# Patient Record
Sex: MALE | Race: WHITE | NOT HISPANIC OR LATINO | Employment: FULL TIME | URBAN - METROPOLITAN AREA
[De-identification: names, ages, dates, MRNs, and addresses within clinical notes are randomized per-mention and may not be internally consistent; named-entity substitution may affect disease eponyms.]

---

## 2017-08-24 PROBLEM — E11.42 TYPE 2 DIABETES MELLITUS WITH DIABETIC POLYNEUROPATHY, WITHOUT LONG-TERM CURRENT USE OF INSULIN (HCC): Status: ACTIVE | Noted: 2017-08-24

## 2018-02-14 ENCOUNTER — PATIENT OUTREACH (OUTPATIENT)
Dept: HEALTH INFORMATION MANAGEMENT | Facility: OTHER | Age: 53
End: 2018-02-14

## 2022-06-06 ENCOUNTER — OFFICE VISIT (OUTPATIENT)
Dept: URGENT CARE | Facility: CLINIC | Age: 57
End: 2022-06-06
Payer: COMMERCIAL

## 2022-06-06 VITALS
OXYGEN SATURATION: 95 % | HEART RATE: 100 BPM | SYSTOLIC BLOOD PRESSURE: 122 MMHG | BODY MASS INDEX: 31.44 KG/M2 | RESPIRATION RATE: 16 BRPM | DIASTOLIC BLOOD PRESSURE: 78 MMHG | HEIGHT: 74 IN | TEMPERATURE: 97.7 F | WEIGHT: 245 LBS

## 2022-06-06 DIAGNOSIS — R19.7 DIARRHEA OF PRESUMED INFECTIOUS ORIGIN: ICD-10-CM

## 2022-06-06 PROCEDURE — 99203 OFFICE O/P NEW LOW 30 MIN: CPT | Performed by: NURSE PRACTITIONER

## 2022-06-06 RX ORDER — ROSUVASTATIN CALCIUM 5 MG/1
5 TABLET, COATED ORAL EVERY EVENING
COMMUNITY
End: 2023-06-21

## 2022-06-06 RX ORDER — EMPAGLIFLOZIN AND METFORMIN HYDROCHLORIDE 12.5; 1 MG/1; MG/1
TABLET ORAL
COMMUNITY
End: 2023-06-21

## 2022-06-06 RX ORDER — LOPERAMIDE HYDROCHLORIDE 2 MG/1
2 CAPSULE ORAL
Qty: 30 CAPSULE | Refills: 0 | Status: SHIPPED | OUTPATIENT
Start: 2022-06-06 | End: 2023-06-21

## 2022-06-06 RX ORDER — AZITHROMYCIN 500 MG/1
500 TABLET, FILM COATED ORAL DAILY
Qty: 3 TABLET | Refills: 0 | Status: SHIPPED | OUTPATIENT
Start: 2022-06-06 | End: 2022-06-09

## 2022-06-06 RX ORDER — INSULIN DEGLUDEC 200 U/ML
INJECTION, SOLUTION SUBCUTANEOUS
COMMUNITY
End: 2023-06-21

## 2022-06-06 ASSESSMENT — ENCOUNTER SYMPTOMS
CARDIOVASCULAR NEGATIVE: 1
NAUSEA: 1
BLOOD IN STOOL: 0
DIARRHEA: 1
RESPIRATORY NEGATIVE: 1
ABDOMINAL PAIN: 1
VOMITING: 0

## 2022-06-06 ASSESSMENT — VISUAL ACUITY: OU: 1

## 2022-06-06 NOTE — PROGRESS NOTES
Subjective:     Reinier Soria is a 56 y.o. male who presents for Diarrhea (X 5 days, diarrhea and nausea)       Diarrhea   This is a new problem. The problem has been gradually worsening. Associated symptoms include abdominal pain. Pertinent negatives include no vomiting. Risk factors: .     Patient reporting about 12 episodes of diarrhea each day. Feels bloated. Fatigued. Nauseous. No vomiting. 5 days of symptoms. Worsening.    Patient was screened prior to rooming and denied COVID-19 diagnosis or contact with a person who has been diagnosed or is suspected to have COVID-19. During this visit, appropriate PPE was worn, hand hygiene was performed, and the patient and any visitors were masked.     PMH:  has a past medical history of Chicken pox, Diabetes mellitus type 2, uncontrolled (HCC) (12/16/2011), Hypertension (4/21/2010), and Increased BMI (4/21/2010).    MEDS:   Current Outpatient Medications:   •  Insulin Degludec (TRESIBA FLEXTOUCH) 200 UNIT/ML Solution Pen-injector, Inject  under the skin., Disp: , Rfl:   •  Semaglutide (OZEMPIC, 2 MG/DOSE, SC), Inject  under the skin., Disp: , Rfl:   •  Empagliflozin-metFORMIN HCl (SYNJARDY) 12.5-1000 MG Tab, Take  by mouth., Disp: , Rfl:   •  rosuvastatin (CRESTOR) 5 MG Tab, Take 5 mg by mouth every evening., Disp: , Rfl:   •  azithromycin (ZITHROMAX) 500 MG tablet, Take 1 Tablet by mouth every day for 3 days., Disp: 3 Tablet, Rfl: 0  •  loperamide (IMODIUM) 2 MG Cap, Take 1 Capsule by mouth every 3 hours as needed for Diarrhea., Disp: 30 Capsule, Rfl: 0  •  pioglitazone (ACTOS) 15 MG Tab, TAKE ONE TABLET BY MOUTH DAILY, Disp: 90 Tab, Rfl: 1  •  lisinopril (PRINIVIL) 5 MG Tab, TAKE ONE TABLET BY MOUTH DAILY, Disp: 90 Tab, Rfl: 1  •  gabapentin (NEURONTIN) 600 MG tablet, TAKE ONE TABLET BY MOUTH THREE TIMES DAILY, Disp: 270 Tab, Rfl: 1  •  gemfibrozil (LOPID) 600 MG Tab, TAKE ONE TABLET BY MOUTH TWICE DAILY, Disp: 180 Tab, Rfl: 1  •  ibuprofen (MOTRIN)  "600 MG TABS, Take 1 Tab by mouth every 6 hours as needed for Moderate Pain., Disp: 30 Tab, Rfl: 0  •  aspirin (ASA) 81 MG CHEW chewable tablet, Take 81 mg by mouth every day., Disp: , Rfl:   •  Blood Glucose Monitoring Suppl (GLUCOMETER ELITE CLASSIC) KIT, 1 Device by Does not apply route every day at 6 PM., Disp: 1 Each, Rfl: prn  •  metformin (GLUCOPHAGE) 1000 MG tablet, TAKE ONE TABLET BY MOUTH TWICE A DAY (Patient not taking: Reported on 6/6/2022), Disp: 180 Tab, Rfl: 1  •  glipiZIDE (GLUCOTROL) 5 MG Tab, TAKE ONE TABLET BY MOUTH TWICE A DAY (Patient not taking: Reported on 6/6/2022), Disp: 180 Tab, Rfl: 1    ALLERGIES:   Allergies   Allergen Reactions   • Hctz      Leg cramps     SURGHX:   Past Surgical History:   Procedure Laterality Date   • VENTRAL HERNIA REPAIR LAPAROSCOPIC  4/10/2015    Performed by Sadiq Ortega M.D. at SURGERY [facility] ORS   • AMPUTATION, TOE  2006    left large toe   • INGUINAL HERNIA REPAIR BILATERAL  1992    repaired twice   • TONSILLECTOMY       SOCHX:  reports that he quit smoking about 7 years ago. His smoking use included cigarettes. He has a 30.00 pack-year smoking history. He quit smokeless tobacco use about 40 years ago. He reports current alcohol use. He reports that he does not use drugs.     FH: Reviewed with patient, not pertinent to this visit.    Review of Systems   Constitutional: Positive for malaise/fatigue.   HENT: Negative.    Respiratory: Negative.    Cardiovascular: Negative.    Gastrointestinal: Positive for abdominal pain, diarrhea and nausea. Negative for blood in stool and vomiting.   All other systems reviewed and are negative.    Additional details per HPI.      Objective:     /78 (BP Location: Left arm, Patient Position: Sitting, BP Cuff Size: Large adult)   Pulse 100   Temp 36.5 °C (97.7 °F) (Temporal)   Resp 16   Ht 1.88 m (6' 2\")   Wt 111 kg (245 lb)   SpO2 95%   BMI 31.46 kg/m²     Physical Exam  Vitals reviewed.   Constitutional:       " General: He is not in acute distress.     Appearance: He is well-developed. He is not ill-appearing or toxic-appearing.   Eyes:      General: Vision grossly intact.      Extraocular Movements: Extraocular movements intact.   Cardiovascular:      Rate and Rhythm: Normal rate.   Pulmonary:      Effort: Pulmonary effort is normal. No respiratory distress.   Abdominal:      General: There is distension.      Palpations: Abdomen is soft.      Tenderness: There is no abdominal tenderness.   Musculoskeletal:         General: No deformity. Normal range of motion.      Cervical back: Normal range of motion.   Skin:     General: Skin is warm and dry.      Coloration: Skin is not pale.   Neurological:      Mental Status: He is alert and oriented to person, place, and time.      Sensory: No sensory deficit.      Motor: No weakness.   Psychiatric:         Behavior: Behavior normal. Behavior is cooperative.       Assessment/Plan:     1. Diarrhea of presumed infectious origin  - azithromycin (ZITHROMAX) 500 MG tablet; Take 1 Tablet by mouth every day for 3 days.  Dispense: 3 Tablet; Refill: 0  - loperamide (IMODIUM) 2 MG Cap; Take 1 Capsule by mouth every 3 hours as needed for Diarrhea.  Dispense: 30 Capsule; Refill: 0    Rx as above sent electronically.     Discussed clear liquid, BRAT, and bland diets and then advancing as tolerated.     Differential diagnosis, natural history, supportive care, increased fluids, over-the-counter symptom management per 's instructions, close monitoring, and indications for immediate follow-up discussed.     Warning signs reviewed. Return precautions discussed.     All questions answered. Patient agrees with the plan of care.    Discharge summary provided.

## 2022-08-20 ENCOUNTER — APPOINTMENT (OUTPATIENT)
Dept: RADIOLOGY | Facility: MEDICAL CENTER | Age: 57
End: 2022-08-20
Attending: EMERGENCY MEDICINE
Payer: COMMERCIAL

## 2022-08-20 ENCOUNTER — HOSPITAL ENCOUNTER (EMERGENCY)
Facility: MEDICAL CENTER | Age: 57
End: 2022-08-20
Attending: EMERGENCY MEDICINE
Payer: COMMERCIAL

## 2022-08-20 VITALS
SYSTOLIC BLOOD PRESSURE: 101 MMHG | RESPIRATION RATE: 18 BRPM | OXYGEN SATURATION: 93 % | HEIGHT: 74 IN | HEART RATE: 78 BPM | TEMPERATURE: 97.8 F | DIASTOLIC BLOOD PRESSURE: 71 MMHG | WEIGHT: 253.31 LBS | BODY MASS INDEX: 32.51 KG/M2

## 2022-08-20 DIAGNOSIS — E11.621 DIABETIC ULCER OF TOE OF RIGHT FOOT ASSOCIATED WITH TYPE 2 DIABETES MELLITUS, LIMITED TO BREAKDOWN OF SKIN (HCC): ICD-10-CM

## 2022-08-20 DIAGNOSIS — L97.511 DIABETIC ULCER OF TOE OF RIGHT FOOT ASSOCIATED WITH TYPE 2 DIABETES MELLITUS, LIMITED TO BREAKDOWN OF SKIN (HCC): ICD-10-CM

## 2022-08-20 LAB
ALBUMIN SERPL BCP-MCNC: 4.4 G/DL (ref 3.2–4.9)
ALBUMIN/GLOB SERPL: 1.9 G/DL
ALP SERPL-CCNC: 81 U/L (ref 30–99)
ALT SERPL-CCNC: 21 U/L (ref 2–50)
ANION GAP SERPL CALC-SCNC: 14 MMOL/L (ref 7–16)
AST SERPL-CCNC: 21 U/L (ref 12–45)
BASOPHILS # BLD AUTO: 1.3 % (ref 0–1.8)
BASOPHILS # BLD: 0.07 K/UL (ref 0–0.12)
BILIRUB SERPL-MCNC: 0.4 MG/DL (ref 0.1–1.5)
BUN SERPL-MCNC: 17 MG/DL (ref 8–22)
CALCIUM SERPL-MCNC: 9.6 MG/DL (ref 8.5–10.5)
CHLORIDE SERPL-SCNC: 106 MMOL/L (ref 96–112)
CO2 SERPL-SCNC: 20 MMOL/L (ref 20–33)
CREAT SERPL-MCNC: 0.7 MG/DL (ref 0.5–1.4)
EOSINOPHIL # BLD AUTO: 0.26 K/UL (ref 0–0.51)
EOSINOPHIL NFR BLD: 4.9 % (ref 0–6.9)
ERYTHROCYTE [DISTWIDTH] IN BLOOD BY AUTOMATED COUNT: 40.2 FL (ref 35.9–50)
ERYTHROCYTE [SEDIMENTATION RATE] IN BLOOD BY WESTERGREN METHOD: 3 MM/HOUR (ref 0–20)
GFR SERPLBLD CREATININE-BSD FMLA CKD-EPI: 108 ML/MIN/1.73 M 2
GLOBULIN SER CALC-MCNC: 2.3 G/DL (ref 1.9–3.5)
GLUCOSE SERPL-MCNC: 229 MG/DL (ref 65–99)
HCT VFR BLD AUTO: 45.9 % (ref 42–52)
HGB BLD-MCNC: 15.8 G/DL (ref 14–18)
IMM GRANULOCYTES # BLD AUTO: 0.02 K/UL (ref 0–0.11)
IMM GRANULOCYTES NFR BLD AUTO: 0.4 % (ref 0–0.9)
LYMPHOCYTES # BLD AUTO: 1.49 K/UL (ref 1–4.8)
LYMPHOCYTES NFR BLD: 27.9 % (ref 22–41)
MCH RBC QN AUTO: 30.6 PG (ref 27–33)
MCHC RBC AUTO-ENTMCNC: 34.4 G/DL (ref 33.7–35.3)
MCV RBC AUTO: 89 FL (ref 81.4–97.8)
MONOCYTES # BLD AUTO: 0.39 K/UL (ref 0–0.85)
MONOCYTES NFR BLD AUTO: 7.3 % (ref 0–13.4)
NEUTROPHILS # BLD AUTO: 3.11 K/UL (ref 1.82–7.42)
NEUTROPHILS NFR BLD: 58.2 % (ref 44–72)
NRBC # BLD AUTO: 0 K/UL
NRBC BLD-RTO: 0 /100 WBC
PLATELET # BLD AUTO: 190 K/UL (ref 164–446)
PMV BLD AUTO: 10.6 FL (ref 9–12.9)
POTASSIUM SERPL-SCNC: 4.1 MMOL/L (ref 3.6–5.5)
PROT SERPL-MCNC: 6.7 G/DL (ref 6–8.2)
RBC # BLD AUTO: 5.16 M/UL (ref 4.7–6.1)
SODIUM SERPL-SCNC: 140 MMOL/L (ref 135–145)
WBC # BLD AUTO: 5.3 K/UL (ref 4.8–10.8)

## 2022-08-20 PROCEDURE — 73620 X-RAY EXAM OF FOOT: CPT | Mod: LT

## 2022-08-20 PROCEDURE — 85652 RBC SED RATE AUTOMATED: CPT

## 2022-08-20 PROCEDURE — A9270 NON-COVERED ITEM OR SERVICE: HCPCS | Performed by: EMERGENCY MEDICINE

## 2022-08-20 PROCEDURE — 99284 EMERGENCY DEPT VISIT MOD MDM: CPT

## 2022-08-20 PROCEDURE — 700102 HCHG RX REV CODE 250 W/ 637 OVERRIDE(OP): Performed by: EMERGENCY MEDICINE

## 2022-08-20 PROCEDURE — 36415 COLL VENOUS BLD VENIPUNCTURE: CPT

## 2022-08-20 PROCEDURE — 80053 COMPREHEN METABOLIC PANEL: CPT

## 2022-08-20 PROCEDURE — 85025 COMPLETE CBC W/AUTO DIFF WBC: CPT

## 2022-08-20 RX ORDER — AMOXICILLIN AND CLAVULANATE POTASSIUM 875; 125 MG/1; MG/1
1 TABLET, FILM COATED ORAL 2 TIMES DAILY
Qty: 20 TABLET | Refills: 0 | Status: SHIPPED | OUTPATIENT
Start: 2022-08-20 | End: 2022-09-30

## 2022-08-20 RX ORDER — IBUPROFEN 600 MG/1
600 TABLET ORAL ONCE
Status: COMPLETED | OUTPATIENT
Start: 2022-08-20 | End: 2022-08-20

## 2022-08-20 RX ORDER — ACETAMINOPHEN 325 MG/1
650 TABLET ORAL ONCE
Status: COMPLETED | OUTPATIENT
Start: 2022-08-20 | End: 2022-08-20

## 2022-08-20 RX ADMIN — IBUPROFEN 600 MG: 600 TABLET ORAL at 16:23

## 2022-08-20 RX ADMIN — ACETAMINOPHEN 650 MG: 325 TABLET, FILM COATED ORAL at 16:22

## 2022-08-20 NOTE — ED PROVIDER NOTES
ED Provider  Scribed for Tyler Romero D.O. by Brandy Evans. 2022  1:55 PM    Means of arrival: Walk-in  History obtained from: Patient  History limited by: None    CHIEF COMPLAINT  Chief Complaint   Patient presents with    Wound Check     Hx of diabetes and diabetic neuropathy. Has wounds to bilateral feet, individual toes. Some with black coloration. Concerned for infection.      HPI  Reinier Soria is a 56 y.o. male, with a history of type 2 diabetes, who presents to the Emergency Department for a wound check. The patient reports an amputation of his left big toe. 3 months ago he noticed symptoms of swelling of his left toes, a blister on his amputated left big toe, and discoloration of his second left toe. The patient also notes that a few of his toe nails fell off 2 weeks ago. He denies any known trauma, fever, any other sores, or pain secondary to his neuropathy. The patient denies seeing a vascular doctor. No alleviating or exacerbating factors noted.     REVIEW OF SYSTEMS  See HPI for further details. All other systems negative.     PAST MEDICAL HISTORY   has a past medical history of Chicken pox, Diabetes mellitus type 2, uncontrolled (HCC) (2011), Hypertension (2010), and Increased BMI (2010).    SOCIAL HISTORY  Social History     Tobacco Use    Smoking status: Former     Packs/day: 1.00     Years: 30.00     Pack years: 30.00     Types: Cigarettes     Quit date: 2014     Years since quittin.0    Smokeless tobacco: Former     Quit date: 1982   Substance and Sexual Activity    Alcohol use: Yes     Comment: rare    Drug use: No    Sexual activity: Yes     Partners: Male, Female     SURGICAL HISTORY   has a past surgical history that includes inguinal hernia repair bilateral (); amputation, toe (); tonsillectomy; and ventral hernia repair laparoscopic (4/10/2015).    CURRENT MEDICATIONS  Home Medications       Reviewed by Marshall Rubio R.N. (Registered  "Nurse) on 08/20/22 at 0953  Med List Status: Partial     Medication Last Dose Status   aspirin (ASA) 81 MG CHEW chewable tablet  Active   Blood Glucose Monitoring Suppl (GLUCOMETER ELITE CLASSIC) KIT  Active   Empagliflozin-metFORMIN HCl (SYNJARDY) 12.5-1000 MG Tab  Active   gabapentin (NEURONTIN) 600 MG tablet  Active   gemfibrozil (LOPID) 600 MG Tab  Active   glipiZIDE (GLUCOTROL) 5 MG Tab  Active   ibuprofen (MOTRIN) 600 MG TABS  Active   Insulin Degludec (TRESIBA FLEXTOUCH) 200 UNIT/ML Solution Pen-injector  Active   lisinopril (PRINIVIL) 5 MG Tab  Active   loperamide (IMODIUM) 2 MG Cap  Active   metformin (GLUCOPHAGE) 1000 MG tablet  Active   pioglitazone (ACTOS) 15 MG Tab  Active   rosuvastatin (CRESTOR) 5 MG Tab  Active   Semaglutide (OZEMPIC, 2 MG/DOSE, SC)  Active                  ALLERGIES  Allergies   Allergen Reactions    Hctz      Leg cramps     PHYSICAL EXAM  VITAL SIGNS: /81   Pulse 97   Temp 36.7 °C (98 °F) (Temporal)   Resp 16   Ht 1.88 m (6' 2\")   Wt 115 kg (253 lb 4.9 oz)   SpO2 95%   BMI 32.52 kg/m²   Constitutional: Alert in no apparent distress.  HENT: Normocephalic, Atraumatic, mucous membranes are moist.   Eyes: Conjunctiva normal, non-icteric.   Heart: No peripheral cyanosis   Lungs: Respirations are even and unlabored   Skin: Warm, Dry, normal color.   Extremities: Left right big toe has a 1.5 cm ulceration at the plantar surface, there is discoloration of the second toe with an obvious darkened color. The bal of the right foot has a 75 mm ulceration. DP pulses normal.  Neurologic: Alert, Grossly non-focal.   Psychiatric: Affect normal, Judgment normal, Mood normal, Appears appropriate and not intoxicated.     DIAGNOSTIC STUDIES / PROCEDURES  LABS  Results for orders placed or performed during the hospital encounter of 08/20/22   CBC WITH DIFFERENTIAL   Result Value Ref Range    WBC 5.3 4.8 - 10.8 K/uL    RBC 5.16 4.70 - 6.10 M/uL    Hemoglobin 15.8 14.0 - 18.0 g/dL    " Hematocrit 45.9 42.0 - 52.0 %    MCV 89.0 81.4 - 97.8 fL    MCH 30.6 27.0 - 33.0 pg    MCHC 34.4 33.7 - 35.3 g/dL    RDW 40.2 35.9 - 50.0 fL    Platelet Count 190 164 - 446 K/uL    MPV 10.6 9.0 - 12.9 fL    Neutrophils-Polys 58.20 44.00 - 72.00 %    Lymphocytes 27.90 22.00 - 41.00 %    Monocytes 7.30 0.00 - 13.40 %    Eosinophils 4.90 0.00 - 6.90 %    Basophils 1.30 0.00 - 1.80 %    Immature Granulocytes 0.40 0.00 - 0.90 %    Nucleated RBC 0.00 /100 WBC    Neutrophils (Absolute) 3.11 1.82 - 7.42 K/uL    Lymphs (Absolute) 1.49 1.00 - 4.80 K/uL    Monos (Absolute) 0.39 0.00 - 0.85 K/uL    Eos (Absolute) 0.26 0.00 - 0.51 K/uL    Baso (Absolute) 0.07 0.00 - 0.12 K/uL    Immature Granulocytes (abs) 0.02 0.00 - 0.11 K/uL    NRBC (Absolute) 0.00 K/uL   COMP METABOLIC PANEL   Result Value Ref Range    Sodium 140 135 - 145 mmol/L    Potassium 4.1 3.6 - 5.5 mmol/L    Chloride 106 96 - 112 mmol/L    Co2 20 20 - 33 mmol/L    Anion Gap 14.0 7.0 - 16.0    Glucose 229 (H) 65 - 99 mg/dL    Bun 17 8 - 22 mg/dL    Creatinine 0.70 0.50 - 1.40 mg/dL    Calcium 9.6 8.5 - 10.5 mg/dL    AST(SGOT) 21 12 - 45 U/L    ALT(SGPT) 21 2 - 50 U/L    Alkaline Phosphatase 81 30 - 99 U/L    Total Bilirubin 0.4 0.1 - 1.5 mg/dL    Albumin 4.4 3.2 - 4.9 g/dL    Total Protein 6.7 6.0 - 8.2 g/dL    Globulin 2.3 1.9 - 3.5 g/dL    A-G Ratio 1.9 g/dL   Sed Rate   Result Value Ref Range    Sed Rate Westergren 3 0 - 20 mm/hour   ESTIMATED GFR   Result Value Ref Range    GFR (CKD-EPI) 108 >60 mL/min/1.73 m 2     All labs reviewed by me.    RADIOLOGY  DX-FOOT-2- LEFT   Final Result      1.  No evidence of acute fracture or dislocation.   2.  No osseous destruction is seen to suggest osteomyelitis. MRI would be more sensitive for further evaluation.   3.  Amputation of the distal aspect of the distal phalanx of the first digit.   4.  Hallux valgus deformity.   5.  Soft tissue wound involving the plantar forefoot.   6.  Calcaneal spurring.        The radiologist's  interpretations of all radiological studies have been reviewed by me.    Films have been independently by me      COURSE  Pertinent Labs & Imaging studies reviewed. (See chart for details)    1:55 PM - Patient seen and examined at bedside. Discussed plan of care. The patient was given the opportunity to ask questions at this time. Patient agrees to the plan of care.     2:45 PM - Ordered for Sed Rate, CMP, CBC w/ diff., DX-Foot 2-Left, and Estimated GFR to evaluate his symptoms.     5:44 PM - I reevaluated the patient at bedside. I discussed plan for discharge and follow up as outlined below. The patient is stable for discharge at this time and will return for any new or worsening symptoms. Patient verbalizes understanding and support with my plan for discharge.     MEDICAL DECISION MAKING  This is a 56 y.o. male who presents with wounds to the base of the foot.  He has a history of a partial amputation of the great toe he has small ulcerations to the foot and the base of the toe.  There is also discoloration to the second toe the discoloration is primarily at the toenail.  There is no skin blackness I do not suspect gangrene.  There is no signs of significant infection.  He is referred to wound care clinic.  No signs of osteomyelitis at this time.    The patient will return for new or worsening symptoms and is stable at the time of discharge.    The patient is referred to a primary physician for diabetic screening, and for all other preventative health concerns.    DISPOSITION:  Patient will be discharged home in stable condition.    FOLLOW UP:  [facility] [address] [phone]  In 1 week    OUTPATIENT MEDICATIONS:  Discharge Medication List as of 8/20/2022  5:58 PM        START taking these medications    Details   amoxicillin-clavulanate (AUGMENTIN) 875-125 MG Tab Take 1 Tablet by mouth 2 times a day., Disp-20 Tablet, R-0, Normal            FINAL IMPRESSION  1. Diabetic ulcer of toe of right foot associated with type 2 diabetes mellitus, limited to breakdown of skin (HCC)      IBrandy (Scribe), am scribing for, and in the presence of, Tyler Romero D.O..    Electronically signed by: Brandy Evans (Scribe), 8/20/2022    Tyler MOHR D.O. personally performed the services described in this documentation, as scribed by Brandy Evans in my presence, and it is both accurate and complete.    The note accurately reflects work and decisions made by me.  Tyler Romero D.O.  8/20/2022  7:58 PM

## 2022-08-20 NOTE — ED TRIAGE NOTES
"Chief Complaint   Patient presents with    Wound Check     Hx of diabetes and diabetic neuropathy. Has wounds to bilateral feet, individual toes. Some with black coloration. Concerned for infection.      Pt to triage for above. No distress noted. Ambulatory with steady gait.     /83   Pulse (!) 103   Temp 35.8 °C (96.5 °F) (Temporal)   Resp 18   Ht 1.88 m (6' 2\")   Wt 115 kg (253 lb 4.9 oz)   SpO2 94%   BMI 32.52 kg/m²     "

## 2022-08-20 NOTE — ED NOTES
Pt able to ambulate to T-1 from the lobby with steady gait.      Pt reports blackening and loss of toenail to the Second digit of the L foot.      VSS. AAO x4.      Pt changed into gown and placed on BP monitor.    Chart up for ERP.

## 2022-08-21 NOTE — DISCHARGE INSTRUCTIONS
Keep the areas clean and dry, keep them covered with a bandage during the day.    Referral has been placed for wound care, in the meantime you are being placed on antibiotics to prevent infection.    I have placed a referral in for wound care they should contact you this week to make a follow-up appointment otherwise call the number provided above for follow-up.

## 2023-06-21 ENCOUNTER — HOSPITAL ENCOUNTER (INPATIENT)
Facility: MEDICAL CENTER | Age: 58
LOS: 1 days | DRG: 247 | End: 2023-06-24
Attending: EMERGENCY MEDICINE | Admitting: INTERNAL MEDICINE
Payer: COMMERCIAL

## 2023-06-21 ENCOUNTER — APPOINTMENT (OUTPATIENT)
Dept: RADIOLOGY | Facility: MEDICAL CENTER | Age: 58
DRG: 247 | End: 2023-06-21
Attending: EMERGENCY MEDICINE
Payer: COMMERCIAL

## 2023-06-21 ENCOUNTER — APPOINTMENT (OUTPATIENT)
Dept: RADIOLOGY | Facility: MEDICAL CENTER | Age: 58
DRG: 247 | End: 2023-06-21
Payer: COMMERCIAL

## 2023-06-21 ENCOUNTER — OFFICE VISIT (OUTPATIENT)
Dept: URGENT CARE | Facility: PHYSICIAN GROUP | Age: 58
End: 2023-06-21
Payer: COMMERCIAL

## 2023-06-21 VITALS
HEIGHT: 74 IN | TEMPERATURE: 97.8 F | SYSTOLIC BLOOD PRESSURE: 110 MMHG | OXYGEN SATURATION: 95 % | HEART RATE: 108 BPM | BODY MASS INDEX: 34.65 KG/M2 | DIASTOLIC BLOOD PRESSURE: 80 MMHG | RESPIRATION RATE: 16 BRPM | WEIGHT: 270 LBS

## 2023-06-21 DIAGNOSIS — R07.9 EXERTIONAL CHEST PAIN: ICD-10-CM

## 2023-06-21 DIAGNOSIS — I20.0 UNSTABLE ANGINA (HCC): ICD-10-CM

## 2023-06-21 DIAGNOSIS — R73.9 HYPERGLYCEMIA: ICD-10-CM

## 2023-06-21 DIAGNOSIS — R94.39 POSITIVE CARDIAC STRESS TEST: ICD-10-CM

## 2023-06-21 DIAGNOSIS — R07.9 CHEST PAIN, UNSPECIFIED TYPE: ICD-10-CM

## 2023-06-21 DIAGNOSIS — I25.118 CORONARY ARTERY DISEASE OF NATIVE ARTERY WITH STABLE ANGINA PECTORIS, UNSPECIFIED WHETHER NATIVE OR TRANSPLANTED HEART (HCC): ICD-10-CM

## 2023-06-21 LAB
ALBUMIN SERPL BCP-MCNC: 4.5 G/DL (ref 3.2–4.9)
ALBUMIN/GLOB SERPL: 1.9 G/DL
ALP SERPL-CCNC: 95 U/L (ref 30–99)
ALT SERPL-CCNC: 38 U/L (ref 2–50)
ANION GAP SERPL CALC-SCNC: 14 MMOL/L (ref 7–16)
AST SERPL-CCNC: 27 U/L (ref 12–45)
BASOPHILS # BLD AUTO: 1.5 % (ref 0–1.8)
BASOPHILS # BLD: 0.09 K/UL (ref 0–0.12)
BILIRUB SERPL-MCNC: 0.6 MG/DL (ref 0.1–1.5)
BUN SERPL-MCNC: 13 MG/DL (ref 8–22)
CALCIUM ALBUM COR SERPL-MCNC: 10.1 MG/DL (ref 8.5–10.5)
CALCIUM SERPL-MCNC: 10.5 MG/DL (ref 8.5–10.5)
CHLORIDE SERPL-SCNC: 103 MMOL/L (ref 96–112)
CO2 SERPL-SCNC: 21 MMOL/L (ref 20–33)
CREAT SERPL-MCNC: 0.8 MG/DL (ref 0.5–1.4)
D DIMER PPP IA.FEU-MCNC: <0.27 UG/ML (FEU) (ref 0–0.5)
EKG IMPRESSION: NORMAL
EOSINOPHIL # BLD AUTO: 0.17 K/UL (ref 0–0.51)
EOSINOPHIL NFR BLD: 2.8 % (ref 0–6.9)
ERYTHROCYTE [DISTWIDTH] IN BLOOD BY AUTOMATED COUNT: 39.4 FL (ref 35.9–50)
GFR SERPLBLD CREATININE-BSD FMLA CKD-EPI: 103 ML/MIN/1.73 M 2
GLOBULIN SER CALC-MCNC: 2.4 G/DL (ref 1.9–3.5)
GLUCOSE BLD STRIP.AUTO-MCNC: 303 MG/DL (ref 65–99)
GLUCOSE SERPL-MCNC: 342 MG/DL (ref 65–99)
HCT VFR BLD AUTO: 49.7 % (ref 42–52)
HGB BLD-MCNC: 17.2 G/DL (ref 14–18)
IMM GRANULOCYTES # BLD AUTO: 0.03 K/UL (ref 0–0.11)
IMM GRANULOCYTES NFR BLD AUTO: 0.5 % (ref 0–0.9)
LYMPHOCYTES # BLD AUTO: 1.82 K/UL (ref 1–4.8)
LYMPHOCYTES NFR BLD: 29.6 % (ref 22–41)
MCH RBC QN AUTO: 30.6 PG (ref 27–33)
MCHC RBC AUTO-ENTMCNC: 34.6 G/DL (ref 32.3–36.5)
MCV RBC AUTO: 88.4 FL (ref 81.4–97.8)
MONOCYTES # BLD AUTO: 0.55 K/UL (ref 0–0.85)
MONOCYTES NFR BLD AUTO: 8.9 % (ref 0–13.4)
NEUTROPHILS # BLD AUTO: 3.49 K/UL (ref 1.82–7.42)
NEUTROPHILS NFR BLD: 56.7 % (ref 44–72)
NRBC # BLD AUTO: 0 K/UL
NRBC BLD-RTO: 0 /100 WBC (ref 0–0.2)
NT-PROBNP SERPL IA-MCNC: <36 PG/ML (ref 0–125)
PLATELET # BLD AUTO: 174 K/UL (ref 164–446)
PMV BLD AUTO: 11.4 FL (ref 9–12.9)
POTASSIUM SERPL-SCNC: 4.4 MMOL/L (ref 3.6–5.5)
PROT SERPL-MCNC: 6.9 G/DL (ref 6–8.2)
RBC # BLD AUTO: 5.62 M/UL (ref 4.7–6.1)
SODIUM SERPL-SCNC: 138 MMOL/L (ref 135–145)
TROPONIN T SERPL-MCNC: 7 NG/L (ref 6–19)
TROPONIN T SERPL-MCNC: 8 NG/L (ref 6–19)
WBC # BLD AUTO: 6.2 K/UL (ref 4.8–10.8)

## 2023-06-21 PROCEDURE — 80053 COMPREHEN METABOLIC PANEL: CPT

## 2023-06-21 PROCEDURE — 85379 FIBRIN DEGRADATION QUANT: CPT

## 2023-06-21 PROCEDURE — 99223 1ST HOSP IP/OBS HIGH 75: CPT | Performed by: INTERNAL MEDICINE

## 2023-06-21 PROCEDURE — 71045 X-RAY EXAM CHEST 1 VIEW: CPT

## 2023-06-21 PROCEDURE — 93000 ELECTROCARDIOGRAM COMPLETE: CPT | Performed by: STUDENT IN AN ORGANIZED HEALTH CARE EDUCATION/TRAINING PROGRAM

## 2023-06-21 PROCEDURE — 700102 HCHG RX REV CODE 250 W/ 637 OVERRIDE(OP): Performed by: INTERNAL MEDICINE

## 2023-06-21 PROCEDURE — A9270 NON-COVERED ITEM OR SERVICE: HCPCS | Performed by: INTERNAL MEDICINE

## 2023-06-21 PROCEDURE — G0378 HOSPITAL OBSERVATION PER HR: HCPCS

## 2023-06-21 PROCEDURE — 83880 ASSAY OF NATRIURETIC PEPTIDE: CPT

## 2023-06-21 PROCEDURE — 82962 GLUCOSE BLOOD TEST: CPT

## 2023-06-21 PROCEDURE — 96372 THER/PROPH/DIAG INJ SC/IM: CPT

## 2023-06-21 PROCEDURE — 3074F SYST BP LT 130 MM HG: CPT | Performed by: STUDENT IN AN ORGANIZED HEALTH CARE EDUCATION/TRAINING PROGRAM

## 2023-06-21 PROCEDURE — 85025 COMPLETE CBC W/AUTO DIFF WBC: CPT

## 2023-06-21 PROCEDURE — 93005 ELECTROCARDIOGRAM TRACING: CPT | Performed by: EMERGENCY MEDICINE

## 2023-06-21 PROCEDURE — 3079F DIAST BP 80-89 MM HG: CPT | Performed by: STUDENT IN AN ORGANIZED HEALTH CARE EDUCATION/TRAINING PROGRAM

## 2023-06-21 PROCEDURE — 36415 COLL VENOUS BLD VENIPUNCTURE: CPT

## 2023-06-21 PROCEDURE — 99215 OFFICE O/P EST HI 40 MIN: CPT | Performed by: STUDENT IN AN ORGANIZED HEALTH CARE EDUCATION/TRAINING PROGRAM

## 2023-06-21 PROCEDURE — 99285 EMERGENCY DEPT VISIT HI MDM: CPT

## 2023-06-21 PROCEDURE — 93005 ELECTROCARDIOGRAM TRACING: CPT

## 2023-06-21 PROCEDURE — 84484 ASSAY OF TROPONIN QUANT: CPT

## 2023-06-21 RX ORDER — INSULIN GLARGINE 300 U/ML
20 INJECTION, SOLUTION SUBCUTANEOUS NIGHTLY
COMMUNITY
End: 2023-11-13

## 2023-06-21 RX ORDER — GEMFIBROZIL 600 MG/1
600 TABLET, FILM COATED ORAL
Status: DISCONTINUED | OUTPATIENT
Start: 2023-06-22 | End: 2023-06-24 | Stop reason: HOSPADM

## 2023-06-21 RX ORDER — PROMETHAZINE HYDROCHLORIDE 25 MG/1
12.5-25 TABLET ORAL EVERY 4 HOURS PRN
Status: DISCONTINUED | OUTPATIENT
Start: 2023-06-21 | End: 2023-06-24 | Stop reason: HOSPADM

## 2023-06-21 RX ORDER — ASPIRIN 325 MG
325 TABLET ORAL DAILY
Status: DISCONTINUED | OUTPATIENT
Start: 2023-06-22 | End: 2023-06-21

## 2023-06-21 RX ORDER — ASPIRIN 81 MG/1
324 TABLET, CHEWABLE ORAL DAILY
Status: DISCONTINUED | OUTPATIENT
Start: 2023-06-22 | End: 2023-06-21

## 2023-06-21 RX ORDER — EMPAGLIFLOZIN, METFORMIN HYDROCHLORIDE 12.5; 1 MG/1; MG/1
2 TABLET, EXTENDED RELEASE ORAL EVERY MORNING
Status: SHIPPED | COMMUNITY
End: 2023-11-13

## 2023-06-21 RX ORDER — SEMAGLUTIDE 1.34 MG/ML
INJECTION, SOLUTION SUBCUTANEOUS
COMMUNITY
Start: 2023-05-09 | End: 2023-07-20

## 2023-06-21 RX ORDER — PROMETHAZINE HYDROCHLORIDE 25 MG/1
12.5-25 SUPPOSITORY RECTAL EVERY 4 HOURS PRN
Status: DISCONTINUED | OUTPATIENT
Start: 2023-06-21 | End: 2023-06-24 | Stop reason: HOSPADM

## 2023-06-21 RX ORDER — REGADENOSON 0.08 MG/ML
0.4 INJECTION, SOLUTION INTRAVENOUS
Status: DISCONTINUED | OUTPATIENT
Start: 2023-06-21 | End: 2023-06-24 | Stop reason: HOSPADM

## 2023-06-21 RX ORDER — NITROGLYCERIN 0.4 MG/1
0.4 TABLET SUBLINGUAL
Status: DISCONTINUED | OUTPATIENT
Start: 2023-06-21 | End: 2023-06-24 | Stop reason: HOSPADM

## 2023-06-21 RX ORDER — GABAPENTIN 300 MG/1
300 CAPSULE ORAL 2 TIMES DAILY
COMMUNITY
Start: 2023-05-14 | End: 2023-11-13

## 2023-06-21 RX ORDER — ROSUVASTATIN CALCIUM 5 MG/1
5 TABLET, COATED ORAL EVERY EVENING
Status: DISCONTINUED | OUTPATIENT
Start: 2023-06-21 | End: 2023-06-22

## 2023-06-21 RX ORDER — OXYCODONE HYDROCHLORIDE 5 MG/1
5 TABLET ORAL
Status: DISCONTINUED | OUTPATIENT
Start: 2023-06-21 | End: 2023-06-24 | Stop reason: HOSPADM

## 2023-06-21 RX ORDER — ERGOCALCIFEROL 1.25 MG/1
50000 CAPSULE ORAL
COMMUNITY
Start: 2023-04-26

## 2023-06-21 RX ORDER — ACETAMINOPHEN 325 MG/1
650 TABLET ORAL EVERY 6 HOURS PRN
Status: DISCONTINUED | OUTPATIENT
Start: 2023-06-21 | End: 2023-06-24 | Stop reason: HOSPADM

## 2023-06-21 RX ORDER — ASPIRIN 81 MG/1
81 TABLET, CHEWABLE ORAL DAILY
Status: DISCONTINUED | OUTPATIENT
Start: 2023-06-22 | End: 2023-06-24 | Stop reason: HOSPADM

## 2023-06-21 RX ORDER — PIOGLITAZONEHYDROCHLORIDE 30 MG/1
1 TABLET ORAL DAILY
COMMUNITY
Start: 2023-04-14

## 2023-06-21 RX ORDER — AMOXICILLIN 250 MG
2 CAPSULE ORAL 2 TIMES DAILY
Status: DISCONTINUED | OUTPATIENT
Start: 2023-06-21 | End: 2023-06-24 | Stop reason: HOSPADM

## 2023-06-21 RX ORDER — ONDANSETRON 2 MG/ML
4 INJECTION INTRAMUSCULAR; INTRAVENOUS EVERY 4 HOURS PRN
Status: DISCONTINUED | OUTPATIENT
Start: 2023-06-21 | End: 2023-06-24 | Stop reason: HOSPADM

## 2023-06-21 RX ORDER — POLYETHYLENE GLYCOL 3350 17 G/17G
1 POWDER, FOR SOLUTION ORAL
Status: DISCONTINUED | OUTPATIENT
Start: 2023-06-21 | End: 2023-06-24 | Stop reason: HOSPADM

## 2023-06-21 RX ORDER — PROCHLORPERAZINE EDISYLATE 5 MG/ML
5-10 INJECTION INTRAMUSCULAR; INTRAVENOUS EVERY 4 HOURS PRN
Status: DISCONTINUED | OUTPATIENT
Start: 2023-06-21 | End: 2023-06-24 | Stop reason: HOSPADM

## 2023-06-21 RX ORDER — ENOXAPARIN SODIUM 100 MG/ML
40 INJECTION SUBCUTANEOUS DAILY
Status: DISCONTINUED | OUTPATIENT
Start: 2023-06-22 | End: 2023-06-24 | Stop reason: HOSPADM

## 2023-06-21 RX ORDER — LABETALOL HYDROCHLORIDE 5 MG/ML
10 INJECTION, SOLUTION INTRAVENOUS EVERY 4 HOURS PRN
Status: DISCONTINUED | OUTPATIENT
Start: 2023-06-21 | End: 2023-06-24 | Stop reason: HOSPADM

## 2023-06-21 RX ORDER — BISACODYL 10 MG
10 SUPPOSITORY, RECTAL RECTAL
Status: DISCONTINUED | OUTPATIENT
Start: 2023-06-21 | End: 2023-06-24 | Stop reason: HOSPADM

## 2023-06-21 RX ORDER — GABAPENTIN 300 MG/1
300 CAPSULE ORAL 2 TIMES DAILY
Status: DISCONTINUED | OUTPATIENT
Start: 2023-06-21 | End: 2023-06-24 | Stop reason: HOSPADM

## 2023-06-21 RX ORDER — ASPIRIN 300 MG/1
300 SUPPOSITORY RECTAL DAILY
Status: DISCONTINUED | OUTPATIENT
Start: 2023-06-22 | End: 2023-06-21

## 2023-06-21 RX ORDER — MORPHINE SULFATE 4 MG/ML
2 INJECTION INTRAVENOUS
Status: DISCONTINUED | OUTPATIENT
Start: 2023-06-21 | End: 2023-06-24 | Stop reason: HOSPADM

## 2023-06-21 RX ORDER — AMINOPHYLLINE 25 MG/ML
100 INJECTION, SOLUTION INTRAVENOUS
Status: DISCONTINUED | OUTPATIENT
Start: 2023-06-21 | End: 2023-06-24 | Stop reason: HOSPADM

## 2023-06-21 RX ORDER — ONDANSETRON 4 MG/1
4 TABLET, ORALLY DISINTEGRATING ORAL EVERY 4 HOURS PRN
Status: DISCONTINUED | OUTPATIENT
Start: 2023-06-21 | End: 2023-06-24 | Stop reason: HOSPADM

## 2023-06-21 RX ORDER — OXYCODONE HYDROCHLORIDE 5 MG/1
2.5 TABLET ORAL
Status: DISCONTINUED | OUTPATIENT
Start: 2023-06-21 | End: 2023-06-24 | Stop reason: HOSPADM

## 2023-06-21 RX ORDER — ASPIRIN 81 MG/1
324 TABLET, CHEWABLE ORAL ONCE
Status: DISCONTINUED | OUTPATIENT
Start: 2023-06-21 | End: 2023-06-21

## 2023-06-21 RX ADMIN — ASPIRIN 324 MG: 81 TABLET, CHEWABLE ORAL at 15:39

## 2023-06-21 RX ADMIN — INSULIN GLARGINE-YFGN 16 UNITS: 100 INJECTION, SOLUTION SUBCUTANEOUS at 22:17

## 2023-06-21 RX ADMIN — INSULIN HUMAN 6 UNITS: 100 INJECTION, SOLUTION PARENTERAL at 22:17

## 2023-06-21 RX ADMIN — OXYCODONE HYDROCHLORIDE 2.5 MG: 5 TABLET ORAL at 23:24

## 2023-06-21 RX ADMIN — GABAPENTIN 300 MG: 300 CAPSULE ORAL at 22:19

## 2023-06-21 RX ADMIN — ROSUVASTATIN CALCIUM 5 MG: 5 TABLET, FILM COATED ORAL at 23:24

## 2023-06-21 ASSESSMENT — ENCOUNTER SYMPTOMS
HEADACHES: 0
SHORTNESS OF BREATH: 1
ORTHOPNEA: 0
LEG PAIN: 0
FEVER: 0
EXERTIONAL CHEST PRESSURE: 1
DIAPHORESIS: 0
NEAR-SYNCOPE: 0
LOWER EXTREMITY EDEMA: 0
BACK PAIN: 0
DIZZINESS: 0
VOMITING: 0
NAUSEA: 0
ABDOMINAL PAIN: 0
PALPITATIONS: 0
NUMBNESS: 0
SYNCOPE: 0
COUGH: 0
WEAKNESS: 0
IRREGULAR HEARTBEAT: 0

## 2023-06-21 ASSESSMENT — HEART SCORE
HEART SCORE: 5
ECG: NORMAL
HISTORY: HIGHLY SUSPICIOUS
RISK FACTORS: >2 RISK FACTORS OR HX OF ATHEROSCLEROTIC DISEASE
AGE: 45-64
TROPONIN: LESS THAN OR EQUAL TO NORMAL LIMIT

## 2023-06-21 ASSESSMENT — FIBROSIS 4 INDEX
FIB4 SCORE: 1.374772708486752002
FIB4 SCORE: 1.374772708486752002
FIB4 SCORE: 1.43

## 2023-06-21 ASSESSMENT — LIFESTYLE VARIABLES
HAVE PEOPLE ANNOYED YOU BY CRITICIZING YOUR DRINKING: NO
TOTAL SCORE: 0
EVER HAD A DRINK FIRST THING IN THE MORNING TO STEADY YOUR NERVES TO GET RID OF A HANGOVER: NO
TOTAL SCORE: 0
TOTAL SCORE: 0
AVERAGE NUMBER OF DAYS PER WEEK YOU HAVE A DRINK CONTAINING ALCOHOL: 0
EVER FELT BAD OR GUILTY ABOUT YOUR DRINKING: NO
CONSUMPTION TOTAL: NEGATIVE
HAVE YOU EVER FELT YOU SHOULD CUT DOWN ON YOUR DRINKING: NO
HOW MANY TIMES IN THE PAST YEAR HAVE YOU HAD 5 OR MORE DRINKS IN A DAY: 0
ON A TYPICAL DAY WHEN YOU DRINK ALCOHOL HOW MANY DRINKS DO YOU HAVE: 0
ALCOHOL_USE: NO

## 2023-06-21 ASSESSMENT — PATIENT HEALTH QUESTIONNAIRE - PHQ9
2. FEELING DOWN, DEPRESSED, IRRITABLE, OR HOPELESS: NOT AT ALL
SUM OF ALL RESPONSES TO PHQ9 QUESTIONS 1 AND 2: 0
1. LITTLE INTEREST OR PLEASURE IN DOING THINGS: NOT AT ALL

## 2023-06-21 ASSESSMENT — PAIN DESCRIPTION - PAIN TYPE: TYPE: ACUTE PAIN

## 2023-06-21 NOTE — PROGRESS NOTES
Subjective     Leighton Soria is a 57 y.o. male who presents with Chest Pain (X3 days. )            Leighton is a 57 y.o. male who presents to urgent care for symptoms of chest pain.  Patient states chest pain started on Sunday (approx. 3 days ago). Patient states this pain is some sternal and dull/achy in characteristic.  No radiation of pain.  Patient is experiencing exertional chest pressure and shortness of breath.  Patient states symptoms have been unchanged since onset.  Exertion worsens symptoms.  Patient states he was out of town for work.  Until he returned to [place] to be evaluated.    Chest Pain   This is a new problem. Episode onset: Sunday. The problem has been unchanged. The pain is present in the substernal region. The pain is mild. The quality of the pain is described as dull and heavy. The pain does not radiate. Associated symptoms include exertional chest pressure and shortness of breath. Pertinent negatives include no abdominal pain, back pain, cough, diaphoresis, dizziness, fever, headaches, irregular heartbeat, leg pain, lower extremity edema, malaise/fatigue, nausea, near-syncope, numbness, orthopnea, palpitations, syncope, vomiting or weakness. The pain is aggravated by exertion. He has tried nothing for the symptoms. Risk factors include male gender, obesity and sedentary lifestyle.   His past medical history is significant for diabetes, hyperlipidemia and hypertension.       Review of Systems   Constitutional:  Negative for diaphoresis, fever and malaise/fatigue.   Respiratory:  Positive for shortness of breath. Negative for cough.    Cardiovascular:  Positive for chest pain. Negative for palpitations, orthopnea, syncope and near-syncope.   Gastrointestinal:  Negative for abdominal pain, nausea and vomiting.   Musculoskeletal:  Negative for back pain.   Neurological:  Negative for dizziness, weakness, numbness and headaches.              Objective     /80 (BP Location: Left arm, Patient  "Position: Sitting, BP Cuff Size: Adult)   Pulse (!) 108   Temp 36.6 °C (97.8 °F) (Temporal)   Resp 16   Ht 1.88 m (6' 2\")   Wt 122 kg (270 lb)   SpO2 95%   BMI 34.67 kg/m²      Physical Exam  Vitals reviewed.   Constitutional:       Appearance: Normal appearance.   HENT:      Head: Normocephalic and atraumatic.   Eyes:      Extraocular Movements: Extraocular movements intact.      Conjunctiva/sclera: Conjunctivae normal.      Pupils: Pupils are equal, round, and reactive to light.   Cardiovascular:      Rate and Rhythm: Regular rhythm. Tachycardia present.   Pulmonary:      Effort: Pulmonary effort is normal.      Breath sounds: Normal breath sounds.   Neurological:      General: No focal deficit present.      Mental Status: He is alert. Mental status is at baseline.                             Assessment & Plan        1. Chest pain, unspecified type  - aspirin (ASA) chewable tab 324 mg  - EKG - Clinic Performed     57 y.o. male presents for chest pain.   PMH of Type 2 DM, hyperlipidemia and hypertension.  16.7% risk for cardiovascular event per ASCVD risk calculator (MDCalc).  Risk factors include male gender, obesity and sedentary lifestyle.     EKG:  Sinus rhythm.  Boderline right axis deviation  Minimal ST elevation, anterior leads.  EKG scanned into patient's chart under media.    No imaging available on site today.    ER transfer. Declined [facility]. Wife will drive him by POV. Transfer center notified of patient's arrival.  Agreeable to ER transfer and plan of care.             "

## 2023-06-21 NOTE — ED TRIAGE NOTES
"Chief Complaint   Patient presents with    Chest Pain     Dull pain located on mid chest started 3 days ago. Also c/o SOB. Denies cardiac hx.     BP (!) 126/97   Pulse (!) 101   Temp 35.9 °C (96.7 °F) (Temporal)   Resp 17   Ht 1.88 m (6' 2\")   Wt 123 kg (271 lb 2.7 oz)   SpO2 94%   BMI 34.82 kg/m²     "

## 2023-06-22 ENCOUNTER — APPOINTMENT (OUTPATIENT)
Dept: RADIOLOGY | Facility: MEDICAL CENTER | Age: 58
DRG: 247 | End: 2023-06-22
Attending: INTERNAL MEDICINE
Payer: COMMERCIAL

## 2023-06-22 ENCOUNTER — APPOINTMENT (OUTPATIENT)
Dept: CARDIOLOGY | Facility: MEDICAL CENTER | Age: 58
DRG: 247 | End: 2023-06-22
Attending: INTERNAL MEDICINE
Payer: COMMERCIAL

## 2023-06-22 PROBLEM — I20.0 UNSTABLE ANGINA (HCC): Status: ACTIVE | Noted: 2023-06-21

## 2023-06-22 PROBLEM — R60.0 LOWER EXTREMITY EDEMA: Status: ACTIVE | Noted: 2023-06-22

## 2023-06-22 PROBLEM — R07.9 CHEST PAIN: Status: RESOLVED | Noted: 2023-06-21 | Resolved: 2023-06-22

## 2023-06-22 LAB
ALBUMIN SERPL BCP-MCNC: 4 G/DL (ref 3.2–4.9)
ALBUMIN/GLOB SERPL: 2 G/DL
ALP SERPL-CCNC: 84 U/L (ref 30–99)
ALT SERPL-CCNC: 35 U/L (ref 2–50)
ANION GAP SERPL CALC-SCNC: 11 MMOL/L (ref 7–16)
AST SERPL-CCNC: 23 U/L (ref 12–45)
BASOPHILS # BLD AUTO: 1.6 % (ref 0–1.8)
BASOPHILS # BLD: 0.09 K/UL (ref 0–0.12)
BILIRUB SERPL-MCNC: 0.5 MG/DL (ref 0.1–1.5)
BUN SERPL-MCNC: 14 MG/DL (ref 8–22)
CALCIUM ALBUM COR SERPL-MCNC: 9.9 MG/DL (ref 8.5–10.5)
CALCIUM SERPL-MCNC: 9.9 MG/DL (ref 8.5–10.5)
CHLORIDE SERPL-SCNC: 100 MMOL/L (ref 96–112)
CHOLEST SERPL-MCNC: 159 MG/DL (ref 100–199)
CO2 SERPL-SCNC: 23 MMOL/L (ref 20–33)
CREAT SERPL-MCNC: 0.84 MG/DL (ref 0.5–1.4)
D DIMER PPP IA.FEU-MCNC: <0.27 UG/ML (FEU) (ref 0–0.5)
EKG IMPRESSION: NORMAL
EOSINOPHIL # BLD AUTO: 0.21 K/UL (ref 0–0.51)
EOSINOPHIL NFR BLD: 3.8 % (ref 0–6.9)
ERYTHROCYTE [DISTWIDTH] IN BLOOD BY AUTOMATED COUNT: 39.8 FL (ref 35.9–50)
EST. AVERAGE GLUCOSE BLD GHB EST-MCNC: 280 MG/DL
GFR SERPLBLD CREATININE-BSD FMLA CKD-EPI: 101 ML/MIN/1.73 M 2
GLOBULIN SER CALC-MCNC: 2 G/DL (ref 1.9–3.5)
GLUCOSE BLD STRIP.AUTO-MCNC: 254 MG/DL (ref 65–99)
GLUCOSE BLD STRIP.AUTO-MCNC: 315 MG/DL (ref 65–99)
GLUCOSE SERPL-MCNC: 362 MG/DL (ref 65–99)
HBA1C MFR BLD: 11.4 % (ref 4–5.6)
HCT VFR BLD AUTO: 47.3 % (ref 42–52)
HDLC SERPL-MCNC: 25 MG/DL
HGB BLD-MCNC: 15.7 G/DL (ref 14–18)
IMM GRANULOCYTES # BLD AUTO: 0.03 K/UL (ref 0–0.11)
IMM GRANULOCYTES NFR BLD AUTO: 0.5 % (ref 0–0.9)
LDLC SERPL CALC-MCNC: ABNORMAL MG/DL
LV EJECT FRACT  99904: 65
LYMPHOCYTES # BLD AUTO: 1.98 K/UL (ref 1–4.8)
LYMPHOCYTES NFR BLD: 35.4 % (ref 22–41)
MCH RBC QN AUTO: 29.4 PG (ref 27–33)
MCHC RBC AUTO-ENTMCNC: 33.2 G/DL (ref 32.3–36.5)
MCV RBC AUTO: 88.6 FL (ref 81.4–97.8)
MONOCYTES # BLD AUTO: 0.46 K/UL (ref 0–0.85)
MONOCYTES NFR BLD AUTO: 8.2 % (ref 0–13.4)
NEUTROPHILS # BLD AUTO: 2.82 K/UL (ref 1.82–7.42)
NEUTROPHILS NFR BLD: 50.5 % (ref 44–72)
NRBC # BLD AUTO: 0 K/UL
NRBC BLD-RTO: 0 /100 WBC (ref 0–0.2)
PLATELET # BLD AUTO: 163 K/UL (ref 164–446)
PMV BLD AUTO: 11.4 FL (ref 9–12.9)
POTASSIUM SERPL-SCNC: 4 MMOL/L (ref 3.6–5.5)
PROT SERPL-MCNC: 6 G/DL (ref 6–8.2)
RBC # BLD AUTO: 5.34 M/UL (ref 4.7–6.1)
SODIUM SERPL-SCNC: 134 MMOL/L (ref 135–145)
TRIGL SERPL-MCNC: 551 MG/DL (ref 0–149)
TROPONIN T SERPL-MCNC: 8 NG/L (ref 6–19)
WBC # BLD AUTO: 5.6 K/UL (ref 4.8–10.8)

## 2023-06-22 PROCEDURE — 85025 COMPLETE CBC W/AUTO DIFF WBC: CPT

## 2023-06-22 PROCEDURE — 96372 THER/PROPH/DIAG INJ SC/IM: CPT

## 2023-06-22 PROCEDURE — 93306 TTE W/DOPPLER COMPLETE: CPT | Mod: 26 | Performed by: INTERNAL MEDICINE

## 2023-06-22 PROCEDURE — 700102 HCHG RX REV CODE 250 W/ 637 OVERRIDE(OP): Performed by: INTERNAL MEDICINE

## 2023-06-22 PROCEDURE — G0378 HOSPITAL OBSERVATION PER HR: HCPCS

## 2023-06-22 PROCEDURE — 93970 EXTREMITY STUDY: CPT

## 2023-06-22 PROCEDURE — 83036 HEMOGLOBIN GLYCOSYLATED A1C: CPT

## 2023-06-22 PROCEDURE — 82962 GLUCOSE BLOOD TEST: CPT | Mod: 91

## 2023-06-22 PROCEDURE — 85379 FIBRIN DEGRADATION QUANT: CPT

## 2023-06-22 PROCEDURE — 93010 ELECTROCARDIOGRAM REPORT: CPT | Mod: 59 | Performed by: INTERNAL MEDICINE

## 2023-06-22 PROCEDURE — 700111 HCHG RX REV CODE 636 W/ 250 OVERRIDE (IP)

## 2023-06-22 PROCEDURE — A9270 NON-COVERED ITEM OR SERVICE: HCPCS | Performed by: INTERNAL MEDICINE

## 2023-06-22 PROCEDURE — 99233 SBSQ HOSP IP/OBS HIGH 50: CPT | Performed by: INTERNAL MEDICINE

## 2023-06-22 PROCEDURE — 99204 OFFICE O/P NEW MOD 45 MIN: CPT | Performed by: INTERNAL MEDICINE

## 2023-06-22 PROCEDURE — 36415 COLL VENOUS BLD VENIPUNCTURE: CPT

## 2023-06-22 PROCEDURE — 80053 COMPREHEN METABOLIC PANEL: CPT

## 2023-06-22 PROCEDURE — 93306 TTE W/DOPPLER COMPLETE: CPT

## 2023-06-22 PROCEDURE — 84484 ASSAY OF TROPONIN QUANT: CPT

## 2023-06-22 PROCEDURE — 80061 LIPID PANEL: CPT

## 2023-06-22 PROCEDURE — 78452 HT MUSCLE IMAGE SPECT MULT: CPT

## 2023-06-22 RX ORDER — ROSUVASTATIN CALCIUM 20 MG/1
20 TABLET, COATED ORAL EVERY EVENING
Status: DISCONTINUED | OUTPATIENT
Start: 2023-06-22 | End: 2023-06-23

## 2023-06-22 RX ORDER — ROSUVASTATIN CALCIUM 20 MG/1
40 TABLET, COATED ORAL EVERY EVENING
Status: DISCONTINUED | OUTPATIENT
Start: 2023-06-22 | End: 2023-06-22

## 2023-06-22 RX ORDER — METOPROLOL SUCCINATE 25 MG/1
25 TABLET, EXTENDED RELEASE ORAL
Status: DISCONTINUED | OUTPATIENT
Start: 2023-06-22 | End: 2023-06-24 | Stop reason: HOSPADM

## 2023-06-22 RX ORDER — LISINOPRIL 10 MG/1
5 TABLET ORAL
Status: DISCONTINUED | OUTPATIENT
Start: 2023-06-22 | End: 2023-06-24 | Stop reason: HOSPADM

## 2023-06-22 RX ORDER — REGADENOSON 0.08 MG/ML
INJECTION, SOLUTION INTRAVENOUS
Status: COMPLETED
Start: 2023-06-22 | End: 2023-06-22

## 2023-06-22 RX ADMIN — INSULIN HUMAN 5 UNITS: 100 INJECTION, SOLUTION PARENTERAL at 06:07

## 2023-06-22 RX ADMIN — ROSUVASTATIN CALCIUM 20 MG: 20 TABLET, FILM COATED ORAL at 20:12

## 2023-06-22 RX ADMIN — LISINOPRIL 5 MG: 10 TABLET ORAL at 20:12

## 2023-06-22 RX ADMIN — INSULIN GLARGINE-YFGN 16 UNITS: 100 INJECTION, SOLUTION SUBCUTANEOUS at 17:35

## 2023-06-22 RX ADMIN — METOPROLOL SUCCINATE 25 MG: 25 TABLET, EXTENDED RELEASE ORAL at 20:12

## 2023-06-22 RX ADMIN — ASPIRIN 81 MG 81 MG: 81 TABLET ORAL at 06:06

## 2023-06-22 RX ADMIN — GABAPENTIN 300 MG: 300 CAPSULE ORAL at 06:06

## 2023-06-22 RX ADMIN — INSULIN HUMAN 5 UNITS: 100 INJECTION, SOLUTION PARENTERAL at 17:35

## 2023-06-22 RX ADMIN — GEMFIBROZIL 600 MG: 600 TABLET ORAL at 17:33

## 2023-06-22 RX ADMIN — GEMFIBROZIL 600 MG: 600 TABLET ORAL at 06:06

## 2023-06-22 RX ADMIN — INSULIN HUMAN 6 UNITS: 100 INJECTION, SOLUTION PARENTERAL at 20:13

## 2023-06-22 RX ADMIN — GABAPENTIN 300 MG: 300 CAPSULE ORAL at 17:33

## 2023-06-22 RX ADMIN — REGADENOSON 0.4 MG: 0.08 INJECTION, SOLUTION INTRAVENOUS at 14:01

## 2023-06-22 ASSESSMENT — ENCOUNTER SYMPTOMS
CONSTIPATION: 0
BLURRED VISION: 0
CLAUDICATION: 0
PALPITATIONS: 0
ABDOMINAL PAIN: 0
TREMORS: 0
WEAKNESS: 0
MYALGIAS: 0
BACK PAIN: 0
SPUTUM PRODUCTION: 0
NERVOUS/ANXIOUS: 0
FLANK PAIN: 0
CHILLS: 0
VOMITING: 0
DIARRHEA: 0
HEADACHES: 0
BRUISES/BLEEDS EASILY: 0
SHORTNESS OF BREATH: 1
FEVER: 0
SPEECH CHANGE: 0
HEARTBURN: 0
HALLUCINATIONS: 0
DIZZINESS: 0
PHOTOPHOBIA: 0
WEIGHT LOSS: 0
ORTHOPNEA: 0
FOCAL WEAKNESS: 0
COUGH: 0
HEMOPTYSIS: 0
NECK PAIN: 0
POLYDIPSIA: 0
NAUSEA: 0
DOUBLE VISION: 0

## 2023-06-22 ASSESSMENT — PAIN DESCRIPTION - PAIN TYPE
TYPE: ACUTE PAIN

## 2023-06-22 ASSESSMENT — COPD QUESTIONNAIRES
COPD SCREENING SCORE: 4
DURING THE PAST 4 WEEKS HOW MUCH DID YOU FEEL SHORT OF BREATH: SOME OF THE TIME
DO YOU EVER COUGH UP ANY MUCUS OR PHLEGM?: NO/ONLY WITH OCCASIONAL COLDS OR INFECTIONS
HAVE YOU SMOKED AT LEAST 100 CIGARETTES IN YOUR ENTIRE LIFE: YES

## 2023-06-22 ASSESSMENT — LIFESTYLE VARIABLES: SUBSTANCE_ABUSE: 0

## 2023-06-22 NOTE — ASSESSMENT & PLAN NOTE
EKG and trop did not show any acute finding   EKG showed perior infarction   Cardiac cath showed severe LAD disease, stent was placed   Echo did not show any wall motion abnormalities with normal ejection fraction 65%  Cont asa and add Brilinta  Increase rosuvastatin to 40 mg daily  Continue metoprolol 25 mg XL daily  Started lisinopril 5 mg daily  Tele and close monitoring

## 2023-06-22 NOTE — ED PROVIDER NOTES
ED Provider Note    CHIEF COMPLAINT  Chief Complaint   Patient presents with    Chest Pain     Dull pain located on mid chest started 3 days ago. Also c/o SOB. Denies cardiac hx.       EXTERNAL RECORDS REVIEWED  Outpatient Notes primary care visit today, with chest pain, patient treated with aspirin and sent to the emergency department    HPI/ROS  LIMITATION TO HISTORY   Select: : None  OUTSIDE HISTORIAN(S):  Family at bedside for discussion of history and symptoms    Reinier Soria is a 57 y.o. male who presents anterior chest discomfort described as pressure-like, worse with exertion, better with rest.  Onset while walking upstairs 3 days ago.  Patient states if he breathes quietly and at rest the pain can go away completely.  If he takes a deep breath there is always some pressure discomfort.  The discomfort markedly worsens with any type of exertion.  No diaphoresis, no nausea or vomiting.  He denies cough.  No trauma.  Patient does not know his family history, he is adopted.  Cardiac risk factors include diabetes, hypercholesterolemia, age, 42-pack-year smoking, states he quit 2 months ago.  No history of DVT or pulmonary embolism.  No fever or cough    PAST MEDICAL HISTORY   has a past medical history of Chicken pox, Diabetes mellitus type 2, uncontrolled (2011), Hypertension (2010), and Increased BMI (2010).    SURGICAL HISTORY   has a past surgical history that includes inguinal hernia repair bilateral (); amputation, toe (); tonsillectomy; and ventral hernia repair laparoscopic (4/10/2015).    FAMILY HISTORY  Family History   Adopted: Yes       SOCIAL HISTORY  Social History     Tobacco Use    Smoking status: Former     Packs/day: 1.00     Years: 30.00     Pack years: 30.00     Types: Cigarettes     Quit date: 2014     Years since quittin.8    Smokeless tobacco: Former     Quit date: 1982   Vaping Use    Vaping Use: Never used   Substance and Sexual Activity     "Alcohol use: Yes     Comment: rare    Drug use: No    Sexual activity: Yes     Partners: Male, Female       CURRENT MEDICATIONS  Home Medications       Reviewed by Tre Mcnair R.N. (Registered Nurse) on 06/21/23 at 1546  Med List Status: Partial     Medication Last Dose Status   aspirin (ASA) 81 MG CHEW chewable tablet  Active   Blood Glucose Monitoring Suppl (GLUCOMETER ELITE CLASSIC) KIT  Active   Continuous Blood Gluc Sensor (FREESTYLE MAKEDA 2 SENSOR) Misc  Active   Empagliflozin-metFORMIN HCl (SYNJARDY) 12.5-1000 MG Tab  Active   gabapentin (NEURONTIN) 300 MG Cap  Active   gabapentin (NEURONTIN) 600 MG tablet  Active   gemfibrozil (LOPID) 600 MG Tab  Active   ibuprofen (MOTRIN) 600 MG TABS  Active   Insulin Degludec (TRESIBA FLEXTOUCH) 200 UNIT/ML Solution Pen-injector  Active   OZEMPIC, 1 MG/DOSE, 4 MG/3ML Solution Pen-injector  Active   pioglitazone (ACTOS) 15 MG Tab  Active   pioglitazone (ACTOS) 30 MG Tab  Active   rosuvastatin (CRESTOR) 5 MG Tab  Active   Semaglutide (OZEMPIC, 2 MG/DOSE, SC)  Active   TOUJEO SOLOSTAR 300 UNIT/ML Solution Pen-injector  Active   vitamin D2, Ergocalciferol, (DRISDOL) 1.25 MG (66010 UT) Cap capsule  Active                    ALLERGIES  Allergies   Allergen Reactions    Hctz      Leg cramps       PHYSICAL EXAM  VITAL SIGNS: BP (!) 126/97   Pulse (!) 101   Temp 35.9 °C (96.7 °F) (Temporal)   Resp 17   Ht 1.88 m (6' 2\")   Wt 123 kg (271 lb 2.7 oz)   SpO2 94%   BMI 34.82 kg/m²    Respiratory: Clear lung sounds  Cardiac: Regular rate and rhythm  GI: Abdomen soft, nontender, no guarding  Musculoskeletal: No chest wall tenderness  Skin: No asymmetric edema  Psychiatric: Calm and cooperative  Neurologic: Speech clear, ambulates without assistance, strength intact    DIAGNOSTIC STUDIES / PROCEDURES  EKG  I have independently interpreted this EKG  EKG Interpretation    Interpreted by emergency department physician    Rhythm: normal sinus   Rate: normal  Axis: right  Ectopy: " none  Conduction: normal  ST Segments: no acute change  T Waves: no acute change  Q Waves: none    Clinical Impression: no acute changes      LABS  Results for orders placed or performed during the hospital encounter of 06/21/23   CBC with Differential   Result Value Ref Range    WBC 6.2 4.8 - 10.8 K/uL    RBC 5.62 4.70 - 6.10 M/uL    Hemoglobin 17.2 14.0 - 18.0 g/dL    Hematocrit 49.7 42.0 - 52.0 %    MCV 88.4 81.4 - 97.8 fL    MCH 30.6 27.0 - 33.0 pg    MCHC 34.6 32.3 - 36.5 g/dL    RDW 39.4 35.9 - 50.0 fL    Platelet Count 174 164 - 446 K/uL    MPV 11.4 9.0 - 12.9 fL    Neutrophils-Polys 56.70 44.00 - 72.00 %    Lymphocytes 29.60 22.00 - 41.00 %    Monocytes 8.90 0.00 - 13.40 %    Eosinophils 2.80 0.00 - 6.90 %    Basophils 1.50 0.00 - 1.80 %    Immature Granulocytes 0.50 0.00 - 0.90 %    Nucleated RBC 0.00 0.00 - 0.20 /100 WBC    Neutrophils (Absolute) 3.49 1.82 - 7.42 K/uL    Lymphs (Absolute) 1.82 1.00 - 4.80 K/uL    Monos (Absolute) 0.55 0.00 - 0.85 K/uL    Eos (Absolute) 0.17 0.00 - 0.51 K/uL    Baso (Absolute) 0.09 0.00 - 0.12 K/uL    Immature Granulocytes (abs) 0.03 0.00 - 0.11 K/uL    NRBC (Absolute) 0.00 K/uL   Complete Metabolic Panel (CMP)   Result Value Ref Range    Sodium 138 135 - 145 mmol/L    Potassium 4.4 3.6 - 5.5 mmol/L    Chloride 103 96 - 112 mmol/L    Co2 21 20 - 33 mmol/L    Anion Gap 14.0 7.0 - 16.0    Glucose 342 (H) 65 - 99 mg/dL    Bun 13 8 - 22 mg/dL    Creatinine 0.80 0.50 - 1.40 mg/dL    Calcium 10.5 8.5 - 10.5 mg/dL    AST(SGOT) 27 12 - 45 U/L    ALT(SGPT) 38 2 - 50 U/L    Alkaline Phosphatase 95 30 - 99 U/L    Total Bilirubin 0.6 0.1 - 1.5 mg/dL    Albumin 4.5 3.2 - 4.9 g/dL    Total Protein 6.9 6.0 - 8.2 g/dL    Globulin 2.4 1.9 - 3.5 g/dL    A-G Ratio 1.9 g/dL   Troponins NOW   Result Value Ref Range    Troponin T 8 6 - 19 ng/L   CORRECTED CALCIUM   Result Value Ref Range    Correct Calcium 10.1 8.5 - 10.5 mg/dL   ESTIMATED GFR   Result Value Ref Range    GFR (CKD-EPI) 103 >60  mL/min/1.73 m 2   D-DIMER   Result Value Ref Range    D-Dimer <0.27 0.00 - 0.50 ug/mL (FEU)   EKG   Result Value Ref Range    Report       [facility] Emergency Dept.    Test Date:  2023  Pt Name:    CELENA MCKEON                Department: ER  MRN:        [id number]                      Room:  Gender:     Male                         Technician: [id number]  :        1965                   Requested By:ER TRIAGE PROTOCOL  Order #:    [id number]                    Reading MD:    Measurements  Intervals                                Axis  Rate:       96                           P:          52  NJ:         167                          QRS:        102  QRSD:       93                           T:          29  QT:         347  QTc:        439    Interpretive Statements  Sinus rhythm  Right axis deviation  No previous ECG available for comparison           RADIOLOGY  I have independently interpreted the diagnostic imaging associated with this visit and am waiting the final reading from the radiologist.   My preliminary interpretation is as follows: Chest x-ray negative for pneumonia, no pneumothorax or broken ribs  Radiologist interpretation:   DX-CHEST-PORTABLE (1 VIEW)   Final Result      Negative single view of the chest.      NM-CARDIAC STRESS TEST    (Results Pending)   US-EXTREMITY VENOUS LOWER BILAT    (Results Pending)   EC-ECHOCARDIOGRAM COMPLETE W/O CONT    (Results Pending)         COURSE & MEDICAL DECISION MAKING    ED Observation Status? No; Patient does not meet criteria for ED Observation.     INITIAL ASSESSMENT, COURSE AND PLAN  Care Narrative: Patient presents with exertional chest pain over the last 3 days, this is concerning giving his multiple risk factors for heart disease including not knowing his family history.  EKG here is negative for ischemia.  Initial troponin negative.  D-dimer returned negative, ruling out pulmonary embolism.  No evidence of lung mass or pneumothorax  on chest x-ray.  Explanations for both exertional as well as chest discomfort with deep breath or unknown at this time.  Plan for hospitalization, his heart score is 5 making him moderate risk.  Patient stated he felt comfortable with this plan.  Currently chest pain-free at rest, was treated by his primary care doctor with aspirin prior to coming to the emergency department.    HTN/IDDM FOLLOW UP:  The patient has known diabetes and is being followed by their primary care doctor      ADDITIONAL PROBLEM LIST  Hyperglycemia: Known diabetes, to follow-up with primary care physician    DISPOSITION AND DISCUSSIONS  I have discussed management of the patient with the following physicians and HESHAM's: Admitting hospitalist service      Decision tools and prescription drugs considered including, but not limited to: HEART Score 5 .    FINAL DIAGNOSIS  1. Exertional chest pain    2. Hyperglycemia           Electronically signed by: Chiki Mancia M.D., 6/21/2023 5:43 PM

## 2023-06-22 NOTE — H&P
Hospital Medicine History & Physical Note    Date of Service  6/21/2023    Primary Care Physician  Hugo Dacosta P.A.-C.        Code Status  Full code    Chief Complaint  Chief Complaint   Patient presents with    Chest Pain     Dull pain located on mid chest started 3 days ago. Also c/o SOB. Denies cardiac hx.       History of Presenting Illness  Reinier Soria is a 57 y.o. male with past medical history of type 2 diabetes, dyslipidemia, hypertension, obesity, who presented 6/21/2023 with complaints of chest pain and shortness of breath.  Onset of symptoms 2 days ago.  Chest pain described as dull and sharp, worsening on deep inspiration, as well as on ambulation, alleviated by rest, in the middle of the chest,, associated with subjective shortness of breath.  Denies palpitations, cough, fever.  He states that he has an appointment with endocrinology tomorrow for management of his diabetes, which he was difficult to control, despite progressively increasing doses of Solostar, currently on 60 units, plus insulin sliding scale.  Blood sugar is typically running in the 300s.  Of note, patient has chronic venous insufficiency and chronic edema bilaterally, and is following with vascular medicine for that.  Initial chest evaluation with troponin, EKG, BNP and D-dimer is negative.  However, heart score is 5, therefore admitted for chest pain rule out.    I discussed the plan of care with patient and ERP .    Review of Systems  Review of Systems   Constitutional:  Negative for chills, fever and weight loss.   HENT:  Negative for ear pain, hearing loss and tinnitus.    Eyes:  Negative for blurred vision, double vision and photophobia.   Respiratory:  Positive for shortness of breath. Negative for cough, hemoptysis and sputum production.    Cardiovascular:  Positive for chest pain. Negative for palpitations and orthopnea.   Gastrointestinal:  Negative for heartburn, nausea and vomiting.   Genitourinary:  Negative  for dysuria, flank pain, frequency and hematuria.   Musculoskeletal:  Negative for back pain, joint pain and neck pain.   Skin:  Negative for itching and rash.   Neurological:  Negative for tremors, speech change, focal weakness and headaches.   Endo/Heme/Allergies:  Negative for environmental allergies and polydipsia. Does not bruise/bleed easily.   Psychiatric/Behavioral:  Negative for hallucinations and substance abuse. The patient is not nervous/anxious.        Past Medical History   has a past medical history of Chicken pox, Diabetes mellitus type 2, uncontrolled (2011), Hypertension (2010), and Increased BMI (2010).    Surgical History   has a past surgical history that includes inguinal hernia repair bilateral (); amputation, toe (); tonsillectomy; and ventral hernia repair laparoscopic (4/10/2015).     Family History  family history is not on file. He was adopted.   Family history reviewed with patient. There is no family history that is pertinent to the chief complaint.     Social History   reports that he quit smoking about 8 years ago. His smoking use included cigarettes. He has a 30.00 pack-year smoking history. He quit smokeless tobacco use about 41 years ago. He reports current alcohol use. He reports that he does not use drugs.    Allergies  Allergies   Allergen Reactions    Hctz      Leg cramps       Medications  Prior to Admission Medications   Prescriptions Last Dose Informant Patient Reported? Taking?   Blood Glucose Monitoring Suppl (GLUCOMETER ELITE CLASSIC) KIT   No No   Si Device by Does not apply route every day at 6 PM.   Continuous Blood Gluc Sensor (FREESTYLE MAKEDA 2 SENSOR) [facility] [place]   Yes No   Sig: CHANGE 1 SENSOR TO CHECK GLUCOSE ONCE EVERY 14 DAYS   Empagliflozin-metFORMIN HCl (SYNJARDY) 12.5-1000 MG Tab   Yes No   Sig: Take  by mouth.   Insulin Degludec (TRESIBA FLEXTOUCH) 200 UNIT/ML Solution Pen-injector   Yes No   Sig: Inject  under the skin.   OZEMPIC, 1  MG/DOSE, 4 MG/3ML Solution Pen-injector   Yes No   Sig: INJECT 1MG SUBCUTANEOUSLY ONCE A WEEK   Semaglutide (OZEMPIC, 2 MG/DOSE, SC)   Yes No   Sig: Inject  under the skin.   TOUJEO SOLOSTAR 300 UNIT/ML Solution Pen-injector   Yes No   Sig: INJECT 60 UNITS SUBCUTANEOUSLY AT BEDTIME AS DIRECTED   aspirin (ASA) 81 MG CHEW chewable tablet   Yes No   Sig: Take 81 mg by mouth every day.   gabapentin (NEURONTIN) 300 MG Cap   Yes No   Sig: Take 300 mg by mouth 2 times a day.   gabapentin (NEURONTIN) 600 MG tablet   No No   Sig: TAKE ONE TABLET BY MOUTH THREE TIMES DAILY   Patient not taking: Reported on 6/21/2023   gemfibrozil (LOPID) 600 MG Tab   No No   Sig: TAKE ONE TABLET BY MOUTH TWICE DAILY   ibuprofen (MOTRIN) 600 MG TABS   No No   Sig: Take 1 Tab by mouth every 6 hours as needed for Moderate Pain.   pioglitazone (ACTOS) 15 MG Tab   No No   Sig: TAKE ONE TABLET BY MOUTH DAILY   pioglitazone (ACTOS) 30 MG Tab   Yes No   rosuvastatin (CRESTOR) 5 MG Tab   Yes No   Sig: Take 5 mg by mouth every evening.   vitamin D2, Ergocalciferol, (DRISDOL) 1.25 MG (35799 UT) Cap capsule   Yes No   Sig: Take 50,000 Units by mouth every 7 days.      Facility-Administered Medications Last Administration Doses Remaining   aspirin (ASA) chewable tab 324 mg 6/21/2023  3:39 PM 0          Physical Exam  Temp:  [35.9 °C (96.7 °F)-36.6 °C (97.8 °F)] 35.9 °C (96.7 °F)  Pulse:  [] 87  Resp:  [16-18] 18  BP: (110-127)/(80-97) 121/85  SpO2:  [94 %-95 %] 94 %  Blood Pressure: 121/85   Temperature: 35.9 °C (96.7 °F)   Pulse: 87   Respiration: 18   Pulse Oximetry: 94 %       Physical Exam  Vitals and nursing note reviewed.   Constitutional:       General: He is not in acute distress.     Appearance: Normal appearance. He is obese.   HENT:      Head: Normocephalic and atraumatic.      Nose: Nose normal.      Mouth/Throat:      Mouth: Mucous membranes are moist.   Eyes:      Extraocular Movements: Extraocular movements intact.      Pupils: Pupils  are equal, round, and reactive to light.   Cardiovascular:      Rate and Rhythm: Normal rate and regular rhythm.   Pulmonary:      Effort: Pulmonary effort is normal.      Breath sounds: Normal breath sounds.   Abdominal:      General: Abdomen is flat. There is no distension.      Tenderness: There is no abdominal tenderness.   Musculoskeletal:         General: No swelling or deformity. Normal range of motion.      Cervical back: Normal range of motion and neck supple.      Right lower leg: Edema (Trace) present.      Left lower leg: Edema (Trace) present.   Skin:     General: Skin is warm and dry.   Neurological:      General: No focal deficit present.      Mental Status: He is alert and oriented to person, place, and time.   Psychiatric:         Mood and Affect: Mood normal.         Behavior: Behavior normal.         Laboratory:  Recent Labs     06/21/23  1610   WBC 6.2   RBC 5.62   HEMOGLOBIN 17.2   HEMATOCRIT 49.7   MCV 88.4   MCH 30.6   MCHC 34.6   RDW 39.4   PLATELETCT 174   MPV 11.4     Recent Labs     06/21/23  1610   SODIUM 138   POTASSIUM 4.4   CHLORIDE 103   CO2 21   GLUCOSE 342*   BUN 13   CREATININE 0.80   CALCIUM 10.5     Recent Labs     06/21/23  1610   ALTSGPT 38   ASTSGOT 27   ALKPHOSPHAT 95   TBILIRUBIN 0.6   GLUCOSE 342*         No results for input(s): NTPROBNP in the last 72 hours.      Recent Labs     06/21/23  1610   TROPONINT 8       Imaging:  DX-CHEST-PORTABLE (1 VIEW)   Final Result      Negative single view of the chest.          X-Ray:  I have personally reviewed the images and compared with prior images.    Assessment/Plan:  Justification for Admission Status  I anticipate this patient is appropriate for observation status at this time because chest pain rule out    Patient will need a Telemetry bed on MEDICAL service .  The need is secondary to chest pain rule out.    * Chest pain- (present on admission)  Assessment & Plan  It appears patient has atypical or noncardiac chest pain.   Initial troponin and EKG is negative.  However heart score is 5, he has multiple risk factors  He will be admitted and monitored with repeat troponin and EKG.  We will plan for pharmacological stress test tomorrow.  We will evaluate heart with ultrasound due to lower extremity edema.        Lower extremity edema  Assessment & Plan  Trace lower extremity edema bilaterally, negative BNP.  Will evaluate with venous ultrasound and transthoracic echo.    Type 2 diabetes mellitus with diabetic polyneuropathy, without long-term current use of insulin (HCC)- (present on admission)  Assessment & Plan  Uncontrolled with hyperglycemia  Continue home regimen  Follow-up with endocrinology    Essential hypertension- (present on admission)  Assessment & Plan  Currently not on any medications.  Blood pressure is normal          VTE prophylaxis: enoxaparin ppx

## 2023-06-22 NOTE — CARE PLAN
Problem: Psychosocial  Goal: Patient's level of anxiety will decrease  Outcome: Progressing     Problem: Discharge Barriers/Planning  Goal: Patient's continuum of care needs are met  Outcome: Progressing  Flowsheets (Taken 6/21/2023 0116)  Continuum of Care Needs:   Assessed for discharge barriers   Communicated discharge barriers to interdisciplinary tream   Involved patient/family/support system in discharge process     Problem: Hemodynamics  Goal: Patient's hemodynamics, fluid balance and neurologic status will be stable or improve  Outcome: Progressing     Problem: Mobility  Goal: Patient's capacity to carry out activities will improve  Outcome: Progressing     Problem: Self Care  Goal: Patient will have the ability to perform ADLs independently or with assistance (bathe, groom, dress, toilet and feed)  Outcome: Progressing   The patient is Stable - Low risk of patient condition declining or worsening    Shift Goals  Clinical Goals: stress test, echo, duplex leg  Patient Goals: rest    Progress made toward(s) clinical / shift goals:  improving    Patient is not progressing towards the following goals:

## 2023-06-22 NOTE — PROGRESS NOTES
4 Eyes Skin Assessment Completed by EDILIA Reddy and TRAVIS Baez.    Head WDL  Ears WDL  Nose WDL  Mouth WDL  Neck WDL  Breast/Chest WDL  Shoulder Blades WDL  Spine WDL  (R) Arm/Elbow/Hand WDL  (L) Arm/Elbow/Hand WDL  Abdomen WDL  Groin WDL  Scrotum/Coccyx/Buttocks WDL  (R) Leg WDL  (L) Leg WDL  (R) Heel/Foot/Toe WDL  (L) Heel/Foot/Toe Previous Toe Amputations          Devices In Places Tele Box and Pulse Ox      Interventions In Place N/A    Possible Skin Injury No    Pictures Uploaded Into Epic N/A  Wound Consult Placed N/A  RN Wound Prevention Protocol Ordered No

## 2023-06-22 NOTE — HOSPITAL COURSE
57-year-old male with history of diabetes uncontrolled, dyslipidemia, hypertension and obesity who presented 6/21 with chest pain.  Started couple days before the admission with the pressure chest pain, does not radiate, worsening with deep inspiration also moving.  Denied any fever or chills and no other symptoms.  On admission D-dimer was negative, troponin was in normal level and EKG did not show ischemic changes.  Patient was admitted to the hospital for chest pain work-up.  Telemetry and trending troponin did not show any acute finding and patient underwent stress test which was negative for any ischemia.  Patient will be discharged on aspirin and atorvastatin with close monitoring with his PCP.      Patient has diabetes and A1c was 11, patient taking oral medications, discussed the importance of healthy diet and exercise, importance of close monitoring with encouraged the patient to check his blood sugar daily and follow-up with his PCP to adjust the medication if needed.

## 2023-06-22 NOTE — PROGRESS NOTES
Report received from night shift RN. Assume care. Pt. AAOx4 pt is bed,  Assessment completed. VSS. Denies pain, White board updated, Pending stress test; All question answered. Pt has call light within reach,  bed is in the lowest position. Pt has no other needs at this time.

## 2023-06-23 ENCOUNTER — APPOINTMENT (OUTPATIENT)
Dept: CARDIOLOGY | Facility: MEDICAL CENTER | Age: 58
DRG: 247 | End: 2023-06-23
Attending: NURSE PRACTITIONER
Payer: COMMERCIAL

## 2023-06-23 LAB
ACT BLD: 239 SEC (ref 74–137)
ACT BLD: 263 SEC (ref 74–137)
ANION GAP SERPL CALC-SCNC: 12 MMOL/L (ref 7–16)
BASOPHILS # BLD AUTO: 1.5 % (ref 0–1.8)
BASOPHILS # BLD: 0.08 K/UL (ref 0–0.12)
BUN SERPL-MCNC: 13 MG/DL (ref 8–22)
CALCIUM SERPL-MCNC: 9.1 MG/DL (ref 8.5–10.5)
CHLORIDE SERPL-SCNC: 101 MMOL/L (ref 96–112)
CO2 SERPL-SCNC: 23 MMOL/L (ref 20–33)
CREAT SERPL-MCNC: 0.7 MG/DL (ref 0.5–1.4)
EKG IMPRESSION: NORMAL
EOSINOPHIL # BLD AUTO: 0.2 K/UL (ref 0–0.51)
EOSINOPHIL NFR BLD: 3.7 % (ref 0–6.9)
ERYTHROCYTE [DISTWIDTH] IN BLOOD BY AUTOMATED COUNT: 38.5 FL (ref 35.9–50)
GFR SERPLBLD CREATININE-BSD FMLA CKD-EPI: 107 ML/MIN/1.73 M 2
GLUCOSE BLD STRIP.AUTO-MCNC: 198 MG/DL (ref 65–99)
GLUCOSE BLD STRIP.AUTO-MCNC: 221 MG/DL (ref 65–99)
GLUCOSE BLD STRIP.AUTO-MCNC: 307 MG/DL (ref 65–99)
GLUCOSE BLD STRIP.AUTO-MCNC: 316 MG/DL (ref 65–99)
GLUCOSE SERPL-MCNC: 280 MG/DL (ref 65–99)
HCT VFR BLD AUTO: 47.2 % (ref 42–52)
HGB BLD-MCNC: 16.5 G/DL (ref 14–18)
IMM GRANULOCYTES # BLD AUTO: 0.03 K/UL (ref 0–0.11)
IMM GRANULOCYTES NFR BLD AUTO: 0.6 % (ref 0–0.9)
INR PPP: 1.03 (ref 0.87–1.13)
LYMPHOCYTES # BLD AUTO: 1.77 K/UL (ref 1–4.8)
LYMPHOCYTES NFR BLD: 32.8 % (ref 22–41)
MAGNESIUM SERPL-MCNC: 1.7 MG/DL (ref 1.5–2.5)
MCH RBC QN AUTO: 30.5 PG (ref 27–33)
MCHC RBC AUTO-ENTMCNC: 35 G/DL (ref 32.3–36.5)
MCV RBC AUTO: 87.2 FL (ref 81.4–97.8)
MONOCYTES # BLD AUTO: 0.52 K/UL (ref 0–0.85)
MONOCYTES NFR BLD AUTO: 9.6 % (ref 0–13.4)
NEUTROPHILS # BLD AUTO: 2.79 K/UL (ref 1.82–7.42)
NEUTROPHILS NFR BLD: 51.8 % (ref 44–72)
NRBC # BLD AUTO: 0 K/UL
NRBC BLD-RTO: 0 /100 WBC (ref 0–0.2)
PLATELET # BLD AUTO: 155 K/UL (ref 164–446)
PMV BLD AUTO: 10.9 FL (ref 9–12.9)
POTASSIUM SERPL-SCNC: 4 MMOL/L (ref 3.6–5.5)
PROTHROMBIN TIME: 13.4 SEC (ref 12–14.6)
RBC # BLD AUTO: 5.41 M/UL (ref 4.7–6.1)
SODIUM SERPL-SCNC: 136 MMOL/L (ref 135–145)
TROPONIN T SERPL-MCNC: 7 NG/L (ref 6–19)
WBC # BLD AUTO: 5.4 K/UL (ref 4.8–10.8)

## 2023-06-23 PROCEDURE — 700101 HCHG RX REV CODE 250

## 2023-06-23 PROCEDURE — 027034Z DILATION OF CORONARY ARTERY, ONE ARTERY WITH DRUG-ELUTING INTRALUMINAL DEVICE, PERCUTANEOUS APPROACH: ICD-10-PCS | Performed by: INTERNAL MEDICINE

## 2023-06-23 PROCEDURE — A9270 NON-COVERED ITEM OR SERVICE: HCPCS | Performed by: INTERNAL MEDICINE

## 2023-06-23 PROCEDURE — 99153 MOD SED SAME PHYS/QHP EA: CPT

## 2023-06-23 PROCEDURE — 92978 ENDOLUMINL IVUS OCT C 1ST: CPT | Mod: 26,LD | Performed by: INTERNAL MEDICINE

## 2023-06-23 PROCEDURE — 770020 HCHG ROOM/CARE - TELE (206)

## 2023-06-23 PROCEDURE — 700102 HCHG RX REV CODE 250 W/ 637 OVERRIDE(OP): Performed by: INTERNAL MEDICINE

## 2023-06-23 PROCEDURE — 93010 ELECTROCARDIOGRAM REPORT: CPT | Mod: 59 | Performed by: STUDENT IN AN ORGANIZED HEALTH CARE EDUCATION/TRAINING PROGRAM

## 2023-06-23 PROCEDURE — 96372 THER/PROPH/DIAG INJ SC/IM: CPT

## 2023-06-23 PROCEDURE — 99232 SBSQ HOSP IP/OBS MODERATE 35: CPT | Performed by: INTERNAL MEDICINE

## 2023-06-23 PROCEDURE — 700111 HCHG RX REV CODE 636 W/ 250 OVERRIDE (IP)

## 2023-06-23 PROCEDURE — 84484 ASSAY OF TROPONIN QUANT: CPT

## 2023-06-23 PROCEDURE — 93458 L HRT ARTERY/VENTRICLE ANGIO: CPT | Mod: 26,59 | Performed by: INTERNAL MEDICINE

## 2023-06-23 PROCEDURE — 36415 COLL VENOUS BLD VENIPUNCTURE: CPT

## 2023-06-23 PROCEDURE — 700117 HCHG RX CONTRAST REV CODE 255: Performed by: INTERNAL MEDICINE

## 2023-06-23 PROCEDURE — 700102 HCHG RX REV CODE 250 W/ 637 OVERRIDE(OP)

## 2023-06-23 PROCEDURE — 4A023N7 MEASUREMENT OF CARDIAC SAMPLING AND PRESSURE, LEFT HEART, PERCUTANEOUS APPROACH: ICD-10-PCS | Performed by: INTERNAL MEDICINE

## 2023-06-23 PROCEDURE — 93005 ELECTROCARDIOGRAM TRACING: CPT | Performed by: INTERNAL MEDICINE

## 2023-06-23 PROCEDURE — 83735 ASSAY OF MAGNESIUM: CPT

## 2023-06-23 PROCEDURE — 99232 SBSQ HOSP IP/OBS MODERATE 35: CPT | Mod: FS | Performed by: INTERNAL MEDICINE

## 2023-06-23 PROCEDURE — 85025 COMPLETE CBC W/AUTO DIFF WBC: CPT

## 2023-06-23 PROCEDURE — B2111ZZ FLUOROSCOPY OF MULTIPLE CORONARY ARTERIES USING LOW OSMOLAR CONTRAST: ICD-10-PCS | Performed by: INTERNAL MEDICINE

## 2023-06-23 PROCEDURE — 700111 HCHG RX REV CODE 636 W/ 250 OVERRIDE (IP): Performed by: INTERNAL MEDICINE

## 2023-06-23 PROCEDURE — 99152 MOD SED SAME PHYS/QHP 5/>YRS: CPT | Performed by: INTERNAL MEDICINE

## 2023-06-23 PROCEDURE — 92928 PRQ TCAT PLMT NTRAC ST 1 LES: CPT | Mod: LD | Performed by: INTERNAL MEDICINE

## 2023-06-23 PROCEDURE — 85610 PROTHROMBIN TIME: CPT

## 2023-06-23 PROCEDURE — A9270 NON-COVERED ITEM OR SERVICE: HCPCS

## 2023-06-23 PROCEDURE — 80048 BASIC METABOLIC PNL TOTAL CA: CPT

## 2023-06-23 PROCEDURE — 85347 COAGULATION TIME ACTIVATED: CPT | Mod: 91

## 2023-06-23 PROCEDURE — 82962 GLUCOSE BLOOD TEST: CPT | Mod: 91

## 2023-06-23 RX ORDER — HEPARIN SODIUM 200 [USP'U]/100ML
INJECTION, SOLUTION INTRAVENOUS
Status: COMPLETED
Start: 2023-06-23 | End: 2023-06-23

## 2023-06-23 RX ORDER — VERAPAMIL HYDROCHLORIDE 2.5 MG/ML
INJECTION, SOLUTION INTRAVENOUS
Status: COMPLETED
Start: 2023-06-23 | End: 2023-06-23

## 2023-06-23 RX ORDER — HEPARIN SODIUM 1000 [USP'U]/ML
INJECTION, SOLUTION INTRAVENOUS; SUBCUTANEOUS
Status: COMPLETED
Start: 2023-06-23 | End: 2023-06-23

## 2023-06-23 RX ORDER — MIDAZOLAM HYDROCHLORIDE 1 MG/ML
INJECTION INTRAMUSCULAR; INTRAVENOUS
Status: COMPLETED
Start: 2023-06-23 | End: 2023-06-23

## 2023-06-23 RX ORDER — LIDOCAINE HYDROCHLORIDE 20 MG/ML
INJECTION, SOLUTION INFILTRATION; PERINEURAL
Status: COMPLETED
Start: 2023-06-23 | End: 2023-06-23

## 2023-06-23 RX ORDER — ROSUVASTATIN CALCIUM 20 MG/1
40 TABLET, COATED ORAL EVERY EVENING
Status: DISCONTINUED | OUTPATIENT
Start: 2023-06-23 | End: 2023-06-24 | Stop reason: HOSPADM

## 2023-06-23 RX ORDER — ASPIRIN 81 MG/1
81 TABLET ORAL DAILY
Status: DISCONTINUED | OUTPATIENT
Start: 2023-06-23 | End: 2023-06-24 | Stop reason: HOSPADM

## 2023-06-23 RX ORDER — SODIUM CHLORIDE 9 MG/ML
INJECTION, SOLUTION INTRAVENOUS CONTINUOUS
Status: DISCONTINUED | OUTPATIENT
Start: 2023-06-23 | End: 2023-06-24 | Stop reason: HOSPADM

## 2023-06-23 RX ADMIN — ROSUVASTATIN CALCIUM 40 MG: 20 TABLET, FILM COATED ORAL at 17:11

## 2023-06-23 RX ADMIN — INSULIN HUMAN 6 UNITS: 100 INJECTION, SOLUTION PARENTERAL at 20:36

## 2023-06-23 RX ADMIN — NITROGLYCERIN 10 ML: 20 INJECTION INTRAVENOUS at 14:22

## 2023-06-23 RX ADMIN — GABAPENTIN 300 MG: 300 CAPSULE ORAL at 05:45

## 2023-06-23 RX ADMIN — HEPARIN SODIUM: 1000 INJECTION, SOLUTION INTRAVENOUS; SUBCUTANEOUS at 14:22

## 2023-06-23 RX ADMIN — VERAPAMIL HYDROCHLORIDE 5 MG: 2.5 INJECTION, SOLUTION INTRAVENOUS at 14:22

## 2023-06-23 RX ADMIN — LISINOPRIL 5 MG: 10 TABLET ORAL at 05:44

## 2023-06-23 RX ADMIN — ASPIRIN 81 MG: 81 TABLET, COATED ORAL at 17:12

## 2023-06-23 RX ADMIN — ASPIRIN 81 MG 81 MG: 81 TABLET ORAL at 05:44

## 2023-06-23 RX ADMIN — ENOXAPARIN SODIUM 40 MG: 100 INJECTION SUBCUTANEOUS at 17:18

## 2023-06-23 RX ADMIN — TICAGRELOR 180 MG: 90 TABLET ORAL at 15:48

## 2023-06-23 RX ADMIN — INSULIN HUMAN 3 UNITS: 100 INJECTION, SOLUTION PARENTERAL at 17:16

## 2023-06-23 RX ADMIN — INSULIN HUMAN 6 UNITS: 100 INJECTION, SOLUTION PARENTERAL at 05:46

## 2023-06-23 RX ADMIN — FENTANYL CITRATE 100 MCG: 50 INJECTION, SOLUTION INTRAMUSCULAR; INTRAVENOUS at 15:37

## 2023-06-23 RX ADMIN — MIDAZOLAM 2 MG: 1 INJECTION, SOLUTION INTRAMUSCULAR; INTRAVENOUS at 15:37

## 2023-06-23 RX ADMIN — GEMFIBROZIL 600 MG: 600 TABLET ORAL at 05:44

## 2023-06-23 RX ADMIN — GEMFIBROZIL 600 MG: 600 TABLET ORAL at 17:12

## 2023-06-23 RX ADMIN — IOHEXOL 70 ML: 350 INJECTION, SOLUTION INTRAVENOUS at 15:43

## 2023-06-23 RX ADMIN — HEPARIN SODIUM: 1000 INJECTION, SOLUTION INTRAVENOUS; SUBCUTANEOUS at 15:38

## 2023-06-23 RX ADMIN — GABAPENTIN 300 MG: 300 CAPSULE ORAL at 17:11

## 2023-06-23 RX ADMIN — HEPARIN SODIUM 2000 UNITS: 200 INJECTION, SOLUTION INTRAVENOUS at 14:23

## 2023-06-23 RX ADMIN — LIDOCAINE HYDROCHLORIDE: 20 INJECTION, SOLUTION INFILTRATION; PERINEURAL at 14:22

## 2023-06-23 ASSESSMENT — ENCOUNTER SYMPTOMS
NAUSEA: 0
PALPITATIONS: 0
SHORTNESS OF BREATH: 0
ABDOMINAL PAIN: 0
FEVER: 0
STRIDOR: 0
DIZZINESS: 0
WHEEZING: 0
NECK PAIN: 0
CHOKING: 0
COUGH: 0
DOUBLE VISION: 0
HEMOPTYSIS: 0
WEAKNESS: 0
VOMITING: 0
CHEST TIGHTNESS: 0
WEIGHT LOSS: 0
PHOTOPHOBIA: 0
APNEA: 0
SPEECH CHANGE: 0
CLAUDICATION: 0
MYALGIAS: 0
CONSTIPATION: 0
BLURRED VISION: 0
DIARRHEA: 0
CHILLS: 0
ORTHOPNEA: 0

## 2023-06-23 ASSESSMENT — PAIN DESCRIPTION - PAIN TYPE
TYPE: ACUTE PAIN
TYPE: ACUTE PAIN

## 2023-06-23 NOTE — PROCEDURES
Cardiac Catheterization Procedure    DATE: 6/23/2023    : Ramesh Nascimento MD, MPH    PROCEDURES PERFORMED:  Left heart catheterization  Coronary angiography  Percutaneous coronary intervention of mid LAD  Intravascular ultrasound  Moderate conscious sedation    INDICATIONS:  Unstable angina  Positive cardiac stress test    CONSENT:  The complete alternatives, risks, and benefits of the procedure were explained to the patient. Signed informed consent was obtained and placed in the chart prior to the procedure.    MEDICATIONS:  Lidocaine  Fentanyl  Midazolam  Nitroglycerin  Verapamil  Heparin  Brilinta 180 mg p.o.    MODERATE CONSCIOUS SEDATION:  I personally supervised the administration of moderate conscious sedation by the nursing staff for 28 minutes.  Start time: 1515  End time: 1543    CONTRAST: Omnipaque 70 cc    RADIATION (Air Kerma): 544 mGy    FLUOROSCOPY TIME: 9.6 minutes    ESTIMATED BLOOD LOSS: < 50 cc    COMPLICATIONS: None apparent    PROCEDURE OVERVIEW:  The patient was brought to the cardiac catheterization laboratory in the fasting state. The skin over the right wrist was prepped and draped in the usual sterile fashion. Lidocaine infiltration was used to anesthetize the tissue over the right radial artery. Using the micropuncture technique, a 6-Tongan Glidesheath was inserted in the right radial artery. A 5 Tongan AnyPresenceer diagnostic catheter was then advanced over a standard J-wire into the left ventricular cavity where it was gently aspirated, flushed, and then withdrawn across the aortic valve with sequential pressures measured. This catheter was then used to engage the ostium of the left coronary artery and cineangiograms were obtained in multiple projections for complete evaluation of the left coronary system. This catheter was then used to engage the ostium of the right coronary artery and cineangiograms were obtained in multiple projections for complete evaluation of the right  coronary system. Following completion of coronary angiography, we proceeded with PCI of the mid LAD. See below for more details.     HEMODYNAMICS:  Aortic pressure: 99 /68 mmHg  LVEDP: 16 mmHg  No significant aortic gradient on pullback      CORONARY ANGIOGRAPHY:  The left main coronary artery: Large-caliber vessel with mild CAD, bifurcates to LAD and left circumflex  The left anterior descending coronary artery: Large caliber transapical vessel with severe 70-80% mid LAD stenosis status post PCI as detailed below.  There is also mid LAD myocardial bridge after the second diagonal branch.  Gives rise to 2 diagonal branches with luminal irregularities.  The left circumflex coronary artery: Large-caliber vessel with bifurcating OM and diffuse luminal irregularities  The right coronary artery: Moderate caliber dominant vessel with luminal irregularities    PERCUTANEOUS CORONARY INTERVENTION of mid LAD:  Pre: 70-80% stenosis and JIM II flow  Post: 0% residual stenosis and JIM III flow.   Guide Catheter(s): 6 Nepali EBU 3.5  Guidewire(s): Run-through  Anticoagulation:  Heparin to maintain ACT > 250  Antiplatelet(s): Aspirin, Brilinta (patient reported history of TIA hence prasugrel not used)  Pre-dilation balloon(s): 4 x 15 mm NC  IVUS or OCT used: IVUS guided revascularization  Intracoronary Atherectomy / Lithotripsy: None  Stent: Synergy 4 x 24 mm CALE  Post-dilation balloon(s): 4.5 x 15 mm NC     No dissection, signs of no-reflow, distal embolization or perforation post PCI.    Closing: At completion of the procedure the relevant equipment was removed from the body and hemostasis achieved by Radial band for the right radial arteriotomy site.    The patient left the cath lab  CP free,  hemodynamically stable and neurologically intact.      IMPRESSION:  1.  Severe mid LAD disease status post successful IVUS guided PCI deploying Synergy 4 x 24 mm CALE (postdilated to 4.7 mm proximally)  2.  High-normal resting LVEDP 16  mmHg    RECOMMENDATIONS:  Dual antiplatelet therapy for at least 12 months  HI statin: Target LDL < 70 and TG < 150  GDMT and lifestyle modifications for secondary ASCVD prevention  Cardiac Rehab referral  TR band protocol    NOTIFICATION:  The patient's significant other - Marlen - was called and notified of the results.    Referring cardiologist- Dr. Laguna - and IM hospitalist - Dr. Ascencio- were notified.    Ramesh Nascimento MD, MPH [facility]  Interventional Cardiologist  [facility]   of Clinical Internal Medicine - [facility]

## 2023-06-23 NOTE — PROGRESS NOTES
Cardiology Follow Up Progress Note    Date of Service  6/23/2023    Attending Physician  Alfonso Ascencio M.D.    Chief Complaint     Abnormal MPI    HPI  Leighton Soria is a 57 y.o. male admitted 6/21/2023 with ongoing exertional chest pain.      PMH: Dyslipidemia, hypertension, poorly controlled diabetes, obesity BMI 35      Interim Events  No overnight cardiac events  Telemetry SR  Denies having recurrent chest pain  Keep n.p.o.  Plan for [facility] this afternoon    Review of Systems  Review of Systems   Respiratory:  Negative for apnea, cough, choking, chest tightness, shortness of breath, wheezing and stridor.        Vital signs in last 24 hours  Temp:  [36.1 °C (97 °F)-36.9 °C (98.5 °F)] 36.9 °C (98.5 °F)  Pulse:  [] 94  Resp:  [16-18] 18  BP: (103-135)/(66-84) 113/72  SpO2:  [90 %-92 %] 92 %    Physical Exam  Physical Exam  Cardiovascular:      Rate and Rhythm: Normal rate and regular rhythm.      Pulses: Normal pulses.   Skin:     General: Skin is warm.   Neurological:      Mental Status: He is alert. Mental status is at baseline.   Psychiatric:         Mood and Affect: Mood normal.         Lab Review  Lab Results   Component Value Date/Time    WBC 5.4 06/23/2023 12:36 AM    RBC 5.41 06/23/2023 12:36 AM    HEMOGLOBIN 16.5 06/23/2023 12:36 AM    HEMATOCRIT 47.2 06/23/2023 12:36 AM    MCV 87.2 06/23/2023 12:36 AM    MCH 30.5 06/23/2023 12:36 AM    MCHC 35.0 06/23/2023 12:36 AM    MPV 10.9 06/23/2023 12:36 AM      Lab Results   Component Value Date/Time    SODIUM 136 06/23/2023 12:36 AM    POTASSIUM 4.0 06/23/2023 12:36 AM    CHLORIDE 101 06/23/2023 12:36 AM    CO2 23 06/23/2023 12:36 AM    GLUCOSE 280 (H) 06/23/2023 12:36 AM    BUN 13 06/23/2023 12:36 AM    CREATININE 0.70 06/23/2023 12:36 AM    BUNCREATRAT 19 12/31/2018 07:45 AM      Lab Results   Component Value Date/Time    ASTSGOT 23 06/22/2023 12:02 AM    ALTSGPT 35 06/22/2023 12:02 AM     Lab Results   Component Value Date/Time    CHOLSTRLTOT 159  06/22/2023 12:02 AM    LDL see below 06/22/2023 12:02 AM    HDL 25 (A) 06/22/2023 12:02 AM    TRIGLYCERIDE 551 (H) 06/22/2023 12:02 AM    TROPONINT 7 06/23/2023 12:36 AM       Recent Labs     06/21/23  1846   NTPROBNP <36       Cardiac Imaging and Procedures Review  EKG:  My personal interpretation of the EKG dated 6/21/2023   is SR.      Echocardiogram:  6/22/23  No prior study is available for comparison.   The left ventricular ejection fraction is estimated to be 65%.  No significant valvular abnormalities.         Cardiac Catheterization:  pending      Imaging  Chest X-Ray: Unremarkable       Stress Test:  6/22/23  There is a small to moderate sized nonreversible perfusion defect in the apex likely representing infarct. There is no evidence of reversible defect to suggest ischemia. LVEF is within normal limits at 67%.   ECG INTERPRETATION   Nondiagnostic ECG portion of pharmacological stress study.    Assessment/Plan    #Unstable angina, Trop negative  #Abnormal MPI with moderate size defect involving apex  #Poorly controlled diabetes, A1c 11.4  #Mixed hyperlipidemia, triglycerides 551  #Obesity, BMI 35  #Hypertension  #LVEF 65%    Recommendations  -Plan for [facility] this afternoon  -Keep n.p.o.  -Continue aspirin 81, Crestor 20  -Continue Toprol-XL 25, lisinopril 5  -On gemfibrozil 600 twice daily  -Uptitrate [facility] inpatient for better glucose control      Cardiology will follow along    My total time spent caring for the patient on the day of the encounter was 15 minutes.   This does not include time spent on separately billable procedures/tests.       Thank you for allowing me to participate in the care of this patient.  I will continue to follow this patient    Please contact me with any questions.    TONIE Lopez.   Cardiologist, [facility]  [phone]

## 2023-06-23 NOTE — PROGRESS NOTES
Hospital Medicine Daily Progress Note    Date of Service  6/22/2023    Chief Complaint  Reinier Soria is a 57 y.o. male admitted 6/21/2023 with chest pain    Hospital Course  57-year-old male with history of diabetes uncontrolled, dyslipidemia, hypertension and obesity who presented 6/21 with chest pain.  Started couple days before the admission with the pressure chest pain, does not radiate, worsening with deep inspiration also moving.  Denied any fever or chills and no other symptoms.  On admission D-dimer was negative, troponin was in normal level and EKG did not show ischemic changes.  Patient was admitted to the hospital for chest pain work-up.  Telemetry and trending troponin did not show any acute finding and patient underwent stress test showed previous infarction, case was discussed with cardiology and planning for cath..  Patient will be discharged on aspirin and atorvastatin with close monitoring with his PCP.      Patient has diabetes and A1c was 11, patient taking oral medications, discussed the importance of healthy diet and exercise, importance of close monitoring with encouraged the patient to check his blood sugar daily and follow-up with his PCP to adjust the medication if needed.    Interval Problem Update  -pt was evaluated and examined at bedside, no chest pain today  - EKG revieered personally and no changes  -The stress test result was discussed with cardiologit and planning for cath tomorrow.   -started with lisinopril and metoprolol       I have discussed this patient's plan of care and discharge plan at IDT rounds today with Case Management, Nursing, Nursing leadership, and other members of the IDT team.    Consultants/Specialty  cardiology    Code Status  Full Code    Disposition  The patient is not medically cleared for discharge to home or a post-acute facility.  Anticipate discharge to: home with close outpatient follow-up    I have placed the appropriate orders for post-discharge  needs.    Review of Systems  Review of Systems   Constitutional:  Negative for chills, fever and weight loss.   HENT:  Negative for ear pain, hearing loss and tinnitus.    Eyes:  Negative for blurred vision, double vision and photophobia.   Respiratory:  Negative for cough and hemoptysis.    Cardiovascular:  Positive for chest pain. Negative for palpitations, orthopnea and claudication.   Gastrointestinal:  Negative for abdominal pain, constipation, diarrhea, nausea and vomiting.   Genitourinary:  Negative for dysuria, frequency and urgency.   Musculoskeletal:  Negative for myalgias and neck pain.   Skin:  Negative for rash.   Neurological:  Negative for dizziness, speech change and weakness.        Physical Exam  Temp:  [36.2 °C (97.2 °F)-36.9 °C (98.4 °F)] 36.2 °C (97.2 °F)  Pulse:  [] 100  Resp:  [17-18] 18  BP: (117-135)/(79-87) 135/83  SpO2:  [90 %-96 %] 91 %    Physical Exam  Constitutional:       Appearance: He is not ill-appearing.   Eyes:      General: No scleral icterus.  Cardiovascular:      Rate and Rhythm: Normal rate.      Heart sounds: No murmur heard.  Pulmonary:      Effort: No respiratory distress.      Breath sounds: No wheezing.   Abdominal:      General: There is no distension.      Tenderness: There is no abdominal tenderness. There is no right CVA tenderness, left CVA tenderness or guarding.   Musculoskeletal:      Right lower leg: No edema.      Left lower leg: No edema.   Lymphadenopathy:      Cervical: No cervical adenopathy.   Skin:     Coloration: Skin is not jaundiced.      Findings: No bruising, lesion or rash.   Neurological:      General: No focal deficit present.      Mental Status: He is alert and oriented to person, place, and time. Mental status is at baseline.      Cranial Nerves: No cranial nerve deficit.      Motor: No weakness.      Gait: Gait normal.         Fluids  No intake or output data in the 24 hours ending 06/22/23 2887    Laboratory  Recent Labs      06/21/23  1610 06/22/23  0002   WBC 6.2 5.6   RBC 5.62 5.34   HEMOGLOBIN 17.2 15.7   HEMATOCRIT 49.7 47.3   MCV 88.4 88.6   MCH 30.6 29.4   MCHC 34.6 33.2   RDW 39.4 39.8   PLATELETCT 174 163*   MPV 11.4 11.4     Recent Labs     06/21/23  1610 06/22/23  0002   SODIUM 138 134*   POTASSIUM 4.4 4.0   CHLORIDE 103 100   CO2 21 23   GLUCOSE 342* 362*   BUN 13 14   CREATININE 0.80 0.84   CALCIUM 10.5 9.9             Recent Labs     06/22/23  0002   TRIGLYCERIDE 551*   HDL 25*   LDL see below       Imaging  EC-ECHOCARDIOGRAM COMPLETE W/O CONT   Final Result      NM-CARDIAC STRESS TEST   Final Result      US-EXTREMITY VENOUS LOWER BILAT   Final Result      DX-CHEST-PORTABLE (1 VIEW)   Final Result      Negative single view of the chest.           Assessment/Plan  * Unstable angina (HCC)- (present on admission)  Assessment & Plan  EKG and trop did not show any acute finding   EKG showed perior infarction   Case was discussed with cardiologist and planning for cardiac cath   Cont asa, statin, BB and lisinopril   Tele and close monitoring   Start with hiparin ggt if needed      Lower extremity edema  Assessment & Plan  Echo is pending     Type 2 diabetes mellitus with diabetic polyneuropathy, without long-term current use of insulin (HCC)- (present on admission)  Assessment & Plan  Uncontrolled with hyperglycemia  Continue home regimen  Follow-up with endocrinology  A1c11.4    Essential hypertension- (present on admission)  Assessment & Plan  Started with lisinopril and metoprolol            VTE prophylaxis: SCDs/TEDs and enoxaparin ppx    I have performed a physical exam and reviewed and updated ROS and Plan today (6/22/2023). In review of yesterday's note (6/21/2023), there are no changes except as documented above.

## 2023-06-23 NOTE — CARE PLAN
Problem: Knowledge Deficit - Standard  Goal: Patient and family/care givers will demonstrate understanding of plan of care, disease process/condition, diagnostic tests and medications  Outcome: Progressing     Problem: Psychosocial  Goal: Patient's level of anxiety will decrease  Outcome: Progressing     Problem: Discharge Barriers/Planning  Goal: Patient's continuum of care needs are met  Outcome: Progressing     Problem: Hemodynamics  Goal: Patient's hemodynamics, fluid balance and neurologic status will be stable or improve  Outcome: Progressing     Problem: Mobility  Goal: Patient's capacity to carry out activities will improve  Outcome: Progressing     Problem: Self Care  Goal: Patient will have the ability to perform ADLs independently or with assistance (bathe, groom, dress, toilet and feed)  Outcome: Progressing     Problem: Pain - Standard  Goal: Alleviation of pain or a reduction in pain to the patient’s comfort goal  Outcome: Progressing     Problem: Acute Care of the Cardiac Cath Patient  Goal: Pre Procedure Optimal Outcome for the Cardiac Cath Patient  Outcome: Progressing   The patient is Stable - Low risk of patient condition declining or worsening    Shift Goals  Clinical Goals: cardiac cath, rest  Patient Goals: D/C  Family Goals: NA    Progress made toward(s) clinical / shift goals:  improving    Patient is not progressing towards the following goals:

## 2023-06-23 NOTE — PROGRESS NOTES
Report received ;Assume care. Pt. AAOx4 pt is bed,  Assessment completed. VSS. Denies pain, White board updated, No overnight cardiac events; Pending Cardiac Cath, unknown time; Pt has call light within reach, bed is in the lowest position. Pt has no other needs at this time.

## 2023-06-23 NOTE — CONSULTS
Cardiology Initial Consult Note    Reason for Consult:  Asked by Dr Alfonso Ascencio M.D. to see this patient with exertional chest pain/angina, abnormal stress test  Patient's PCP: No primary care provider on file.    CC:   Chief Complaint   Patient presents with    Chest Pain     Dull pain located on mid chest started 3 days ago. Also c/o SOB. Denies cardiac hx.       HPI:   This is a 57-year-old man with past medical history significant for dyslipidemia, hypertension, type 2 diabetes mellitus, obesity who initially presented to [facility] on 6/21/2020 with ongoing exertional chest pain.  Symptoms started about 1 week ago.  He first noticed that when he was at the [facility].  He states that when he walked up stairs to get to his seat, he had substernal chest pressure that was severe.  Symptoms resolved with rest.  Since then, with minimal exertion such as walking on flat surface, patient would have recurrent substernal chest pain.  Also has some associated dyspnea.  Denies having left arm pain jaw pain, palpitations or episodes of syncope.    On admission, EKG was performed which showed sinus rhythm with no ST segment elevation or ST segment depressions.  Troponins were checked and were within normal limits x3.  Underwent cardiac stress test with MPI which showed moderate-sized apical perfusion defect.  Given this finding, cardiology was consulted for ongoing cardiac care.    Medications / Drug list prior to admission:  No current facility-administered medications on file prior to encounter.     Current Outpatient Medications on File Prior to Encounter   Medication Sig Dispense Refill    Empagliflozin-metFORMIN HCl ER (SYNJARDY XR) 12.5-1000 MG TABLET SR 24 HR Take 2 Tablets by mouth every morning.      Continuous Blood Gluc Sensor (FREESTYLE MAKEDA 2 SENSOR) [facility] [place] 1 SENSOR TO CHECK GLUCOSE ONCE EVERY 14 DAYS      vitamin D2, Ergocalciferol, (DRISDOL) 1.25 MG (72728 UT) Cap capsule Take  50,000 Units by mouth every 7 days.      gabapentin (NEURONTIN) 300 MG Cap Take 300 mg by mouth 2 times a day.      TOUJEO SOLOSTAR 300 UNIT/ML Solution Pen-injector Inject 20 Units under the skin every evening.      pioglitazone (ACTOS) 30 MG Tab Take 1 Tablet by mouth every day.      OZEMPIC, 1 MG/DOSE, 4 MG/3ML Solution Pen-injector INJECT 1MG SUBCUTANEOUSLY ONCE A WEEK      gemfibrozil (LOPID) 600 MG Tab TAKE ONE TABLET BY MOUTH TWICE DAILY (Patient taking differently: Take 600 mg by mouth 2 times a day. TAKE ONE TABLET BY MOUTH TWICE DAILY) 180 Tab 1    aspirin (ASA) 81 MG CHEW chewable tablet Take 81 mg by mouth every day.         Current list of administered Medications:    Current Facility-Administered Medications:     rosuvastatin (CRESTOR) tablet 20 mg, 20 mg, Oral, Q EVENING, Alfonso Ascencio M.D.    lisinopril (PRINIVIL) tablet 5 mg, 5 mg, Oral, Q DAY, Alfonso Ascencio M.D.    senna-docusate (PERICOLACE or SENOKOT S) 8.6-50 MG per tablet 2 Tablet, 2 Tablet, Oral, BID **AND** polyethylene glycol/lytes (MIRALAX) PACKET 1 Packet, 1 Packet, Oral, QDAY PRN **AND** magnesium hydroxide (MILK OF MAGNESIA) suspension 30 mL, 30 mL, Oral, QDAY PRN **AND** bisacodyl (DULCOLAX) suppository 10 mg, 10 mg, Rectal, QDAY PRN, Volodymyr Coronel M.D.    Respiratory Therapy Consult, , Nebulization, Continuous RT, Volodymyr Coronel M.D.    enoxaparin (Lovenox) inj 40 mg, 40 mg, Subcutaneous, DAILY AT 1800, Volodymyr Coronel M.D.    acetaminophen (Tylenol) tablet 650 mg, 650 mg, Oral, Q6HRS PRN, Volodymyr Coronel M.D.    labetalol (NORMODYNE/TRANDATE) injection 10 mg, 10 mg, Intravenous, Q4HRS PRN, Volodymyr Coronel M.D.    Notify provider if pain remains uncontrolled, , , CONTINUOUS **AND** Use the Numeric Rating Scale (NRS), Mcmahon-Baker Faces (WBF), or FLACC on regular floors and Critical-Care Pain Observation Tool (CPOT) on ICUs/Trauma to assess pain, , , CONTINUOUS **AND** Pulse Ox, , , CONTINUOUS **AND** Pharmacy  Consult Request ...Pain Management Review 1 Each, 1 Each, Other, PHARMACY TO DOSE **AND** If patient difficult to arouse and/or has respiratory depression (respiratory rate of 10 or less), stop any opiates that are currently infusing and call a Rapid Response., , , CONTINUOUS, Volodymyr Coronel M.D.    oxyCODONE immediate-release (ROXICODONE) tablet 2.5 mg, 2.5 mg, Oral, Q3HRS PRN, 2.5 mg at 06/21/23 2914 **OR** oxyCODONE immediate-release (ROXICODONE) tablet 5 mg, 5 mg, Oral, Q3HRS PRN **OR** morphine 4 MG/ML injection 2 mg, 2 mg, Intravenous, Q3HRS PRN, Volodymyr Coronel M.D.    ondansetron (ZOFRAN) syringe/vial injection 4 mg, 4 mg, Intravenous, Q4HRS PRN, Volodymyr Coronel M.D.    ondansetron (ZOFRAN ODT) dispertab 4 mg, 4 mg, Oral, Q4HRS PRN, Volodymyr Coronel M.D.    promethazine (PHENERGAN) tablet 12.5-25 mg, 12.5-25 mg, Oral, Q4HRS PRN, Volodymyr Coronel M.D.    promethazine (PHENERGAN) suppository 12.5-25 mg, 12.5-25 mg, Rectal, Q4HRS PRN, Volodymyr Coronel M.D.    prochlorperazine (COMPAZINE) injection 5-10 mg, 5-10 mg, Intravenous, Q4HRS PRN, Volodymyr Coronel M.D.    insulin regular (HumuLIN R,NovoLIN R) injection, 2-9 Units, Subcutaneous, 4X/DAY ACHS, 5 Units at 06/22/23 5075 **AND** POC blood glucose manual result, , , Q AC AND BEDTIME(S) **AND** NOTIFY MD and PharmD, , , Once **AND** Administer 20 grams of glucose (approximately 8 ounces of fruit juice) every 15 minutes PRN FSBG less than 70 mg/dL, , , PRN **AND** dextrose 10 % BOLUS 25 g, 25 g, Intravenous, Q15 MIN PRN, Volodymyr Coronel M.D.    regadenoson (LEXISCAN) injection SOLN 0.4 mg, 0.4 mg, Intravenous, Once PRN, Volodymyr Coronel M.D.    aminophylline injection 100 mg, 100 mg, Intravenous, Q5 MIN PRN, Volodymyr Coronel M.D.    nitroglycerin (NITROSTAT) tablet 0.4 mg, 0.4 mg, Sublingual, Q5 MIN PRN, Volodymyr Coronel M.D.    aspirin (ASA) chewable tab 81 mg, 81 mg, Oral, DAILY, Volodymyr Coronel M.D., 81 mg at 06/22/23 0606    gabapentin  (NEURONTIN) capsule 300 mg, 300 mg, Oral, BID, Volodymyr Coronel M.D., 300 mg at 23 1733    gemfibrozil (LOPID) tablet 600 mg, 600 mg, Oral, BID AC, Volodymyr Coronel M.D., 600 mg at 23 1733    insulin GLARGINE (Lantus,Semglee) injection, 16 Units, Subcutaneous, Q EVENING, Volodymyr Coronel M.D., 16 Units at 23 1735    Past Medical History:   Diagnosis Date    Chicken pox     Diabetes mellitus type 2, uncontrolled 2011    Hypertension 2010    Increased BMI 2010       Past Surgical History:   Procedure Laterality Date    AMPUTATION, TOE Left 2022    2nd toe    VENTRAL HERNIA REPAIR LAPAROSCOPIC  04/10/2015    Performed by Sadiq Ortega M.D. at SURGERY [facility]    AMPUTATION, TOE  2006    left large toe    INGUINAL HERNIA REPAIR BILATERAL  1992    repaired twice    TONSILLECTOMY         Family History   Adopted: Yes     Patient family history was personally reviewed, no pertinent family history to current presentation    Social History     Tobacco Use    Smoking status: Former     Packs/day: 1.00     Years: 30.00     Pack years: 30.00     Types: Cigarettes     Quit date: 2014     Years since quittin.8    Smokeless tobacco: Former     Quit date: 1982   Vaping Use    Vaping Use: Former    Quit date: 2023    Substances: Nicotine   Substance Use Topics    Alcohol use: Yes     Comment: rare    Drug use: No       ALLERGIES:  Allergies   Allergen Reactions    Hctz      Leg cramps       Review of systems:  A complete review of symptoms was reviewed with the patient. This is reviewed in H&P and PMH. ALL OTHERS reviewed and negative    Physical exam:  Patient Vitals for the past 24 hrs:   BP Temp Temp src Pulse Resp SpO2 Height Weight   23 1559 117/84 36.3 °C (97.4 °F) Temporal 99 18 91 % -- --   23 1200 117/79 36.3 °C (97.3 °F) Temporal 97 18 93 % -- --   23 0700 128/79 36.9 °C (98.4 °F) Temporal (!) 103 18 96 % -- --   23 0416  "128/79 36.3 °C (97.4 °F) Temporal 100 18 90 % -- --   23 135/87 36.7 °C (98.1 °F) Temporal (!) 104 17 92 % -- --   23 (!) 150/97 36.6 °C (97.9 °F) Temporal 94 20 94 % 1.88 m (6' 2\") 124 kg (274 lb 4 oz)   23 (!) 129/90 -- -- 91 18 93 % -- --   23 (!) 132/97 -- -- 95 19 96 % -- --   23 123/84 -- -- 94 18 95 % -- --     General: Not in acute distress, lying comfortably in bed  HEENT: OP clear   Neck:  No carotid bruits, No JVD appreciated  CVS:  RRR, Normal S1, S2. No murmurs, rubs or gallops  Resp: Normal respiratory effort, lungs CTA bilaterally. No rales or rhonchi  Abdomen: Soft, non-distended, non-tender to palpation, no guarding or rigidity  Skin: No obvious rashes, no cyanosis  Neurological: Alert and oriented x3, moves all extremities, no focal neurologic deficits  Extremities:   Extremities warm, 2+ bilateral radial pulses.  2+ bilateral dp pulses, no lower extremity edema bilaterally      Data:  Laboratory studies personally reviewed by me:  Recent Results (from the past 24 hour(s))   Troponins in two (2) hours    Collection Time: 23  6:46 PM   Result Value Ref Range    Troponin T 7 6 - 19 ng/L   proBrain Natriuretic Peptide, NT    Collection Time: 23  6:46 PM   Result Value Ref Range    NT-proBNP <36 0 - 125 pg/mL   POCT glucose device results    Collection Time: 23  9:11 PM   Result Value Ref Range    POC Glucose, Blood 303 (H) 65 - 99 mg/dL   EKG    Collection Time: 23 11:29 PM   Result Value Ref Range    Report       [facility]    Test Date:  2023  Pt Name:    CELENA MCKEON                Department: ER  MRN:        [id number]                      Room:       T205  Gender:     Male                         Technician: LEXX  :        1965                   Requested By:ER TRIAGE PROTOCOL  Order #:    [id number]                    Reading MD: Ramesh Nascimento MD    Measurements  Intervals                             "    Axis  Rate:       96                           P:          66  MN:         162                          QRS:        102  QRSD:       97                           T:          42  QT:         369  QTc:        467    Interpretive Statements  Sinus rhythm  Right axis deviation  Compared to ECG 06/21/2023 15:52:01  No significant changes  Electronically Signed On 06- 03:05:14 PDT by Ramesh Nascimento MD     CBC with Differential    Collection Time: 06/22/23 12:02 AM   Result Value Ref Range    WBC 5.6 4.8 - 10.8 K/uL    RBC 5.34 4.70 - 6.10 M/uL    Hemoglobin 15.7 14.0 - 18.0 g/dL    Hematocrit 47.3 42.0 - 52.0 %    MCV 88.6 81.4 - 97.8 fL    MCH 29.4 27.0 - 33.0 pg    MCHC 33.2 32.3 - 36.5 g/dL    RDW 39.8 35.9 - 50.0 fL    Platelet Count 163 (L) 164 - 446 K/uL    MPV 11.4 9.0 - 12.9 fL    Neutrophils-Polys 50.50 44.00 - 72.00 %    Lymphocytes 35.40 22.00 - 41.00 %    Monocytes 8.20 0.00 - 13.40 %    Eosinophils 3.80 0.00 - 6.90 %    Basophils 1.60 0.00 - 1.80 %    Immature Granulocytes 0.50 0.00 - 0.90 %    Nucleated RBC 0.00 0.00 - 0.20 /100 WBC    Neutrophils (Absolute) 2.82 1.82 - 7.42 K/uL    Lymphs (Absolute) 1.98 1.00 - 4.80 K/uL    Monos (Absolute) 0.46 0.00 - 0.85 K/uL    Eos (Absolute) 0.21 0.00 - 0.51 K/uL    Baso (Absolute) 0.09 0.00 - 0.12 K/uL    Immature Granulocytes (abs) 0.03 0.00 - 0.11 K/uL    NRBC (Absolute) 0.00 K/uL   Comp Metabolic Panel (CMP)    Collection Time: 06/22/23 12:02 AM   Result Value Ref Range    Sodium 134 (L) 135 - 145 mmol/L    Potassium 4.0 3.6 - 5.5 mmol/L    Chloride 100 96 - 112 mmol/L    Co2 23 20 - 33 mmol/L    Anion Gap 11.0 7.0 - 16.0    Glucose 362 (H) 65 - 99 mg/dL    Bun 14 8 - 22 mg/dL    Creatinine 0.84 0.50 - 1.40 mg/dL    Calcium 9.9 8.5 - 10.5 mg/dL    AST(SGOT) 23 12 - 45 U/L    ALT(SGPT) 35 2 - 50 U/L    Alkaline Phosphatase 84 30 - 99 U/L    Total Bilirubin 0.5 0.1 - 1.5 mg/dL    Albumin 4.0 3.2 - 4.9 g/dL    Total Protein 6.0 6.0 - 8.2 g/dL    Globulin 2.0  1.9 - 3.5 g/dL    A-G Ratio 2.0 g/dL   Lipid Profile    Collection Time: 06/22/23 12:02 AM   Result Value Ref Range    Cholesterol,Tot 159 100 - 199 mg/dL    Triglycerides 551 (H) 0 - 149 mg/dL    HDL 25 (A) >=40 mg/dL    LDL see below <100 mg/dL   TROPONIN    Collection Time: 06/22/23 12:02 AM   Result Value Ref Range    Troponin T 8 6 - 19 ng/L   ESTIMATED GFR    Collection Time: 06/22/23 12:02 AM   Result Value Ref Range    GFR (CKD-EPI) 101 >60 mL/min/1.73 m 2   CORRECTED CALCIUM    Collection Time: 06/22/23 12:02 AM   Result Value Ref Range    Correct Calcium 9.9 8.5 - 10.5 mg/dL   POCT glucose device results    Collection Time: 06/22/23  6:06 AM   Result Value Ref Range    POC Glucose, Blood 254 (H) 65 - 99 mg/dL   D-DIMER    Collection Time: 06/22/23  8:48 AM   Result Value Ref Range    D-Dimer <0.27 0.00 - 0.50 ug/mL (FEU)   HEMOGLOBIN A1C    Collection Time: 06/22/23  8:48 AM   Result Value Ref Range    Glycohemoglobin 11.4 (H) 4.0 - 5.6 %    Est Avg Glucose 280 mg/dL   EC-ECHOCARDIOGRAM COMPLETE W/O CONT    Collection Time: 06/22/23  4:55 PM   Result Value Ref Range    Left Ventrical Ejection Fraction 65        Imaging:  EC-ECHOCARDIOGRAM COMPLETE W/O CONT   Final Result      NM-CARDIAC STRESS TEST   Final Result      US-EXTREMITY VENOUS LOWER BILAT   Final Result      DX-CHEST-PORTABLE (1 VIEW)   Final Result      Negative single view of the chest.            EKG tracings personally reviewed by me shows sinus rhythm, no ST segment elevation/depressions    Echocardiogram 6/22/23:  No prior study is available for comparison.   The left ventricular ejection fraction is estimated to be 65%.  No significant valvular abnormalities.      Stress test 6/22/23:  There is a small to moderate sized nonreversible perfusion defect in the apex likely representing infarct. There is no evidence of reversible defect to   suggest ischemia. LVEF is within normal limits at 67%.  ECG INTERPRETATION  Nondiagnostic ECG portion  of pharmacological stress study.    All pertinent features of laboratory and imaging reviewed including primary images where applicable      Principal Problem (Resolved):    Chest pain (POA: Yes)  Active Problems:    Essential hypertension (POA: Yes)    Type 2 diabetes mellitus with diabetic polyneuropathy, without long-term current use of insulin (HCC) (POA: Yes)    Lower extremity edema (POA: Unknown)      Assessment / Plan:  This is a 57-year-old man with past medical history significant for dyslipidemia, hypertension, type 2 diabetes mellitus, obesity who initially presented to [facility] on 6/21/2020 with unstable angina, subsequently found to have abnormal stress test.    Unstable angina  Abnormal stress test with moderate sized defect involving apex  DM2  Mixed hyperlipidemia  Obesity  Hypertension    Recommendations:  Clinical picture is concerning for unstable angina given new onset exertional chest pain that has been progressive in nature.  Now occurring with minimal exertion.  Review of his stress test certainly shows moderate-sized defect involving the apex which appears to be likely fixed.  Given ongoing symptoms, we will arrange for coronary angiography to rule out obstructive lesions that are amenable to PCI.  We will start beta-blocker therapy with metoprolol 25 mg daily  Continue aspirin 81 mg daily  Continue high intensity statin.  Keep NPO after midnight for cardiac cath    The risks, benefits, and alternatives to coronary angiography with IV sedation were discussed in great detail. Specific risks mentioned include bleeding, infection, kidney damage, allergic reaction, cardiac perforation with possible tamponade requiring leena-cardiocentesis or possible open heart surgery. In addition, we discussed that 10% of patients will experience small to moderate bruising at the side of the arterial puncture. Lastly the risks of heart attack, stroke, and death were discussed; the risks of major  complications such as heart attack or stroke caused by the angiogram is less than 1%; the risk of death is approximately 1 in 1000. The patient verbalized understanding of these potential complications and wishes to proceed with this procedure.      I personally discussed his case with  Dr Alfonso Ascencio M.D.      It is my pleasure to participate in the care of Mr. Soria.  Please do not hesitate to contact me with questions or concerns.    Mendoza Laguna MD  Cardiologist, [facility]    6/22/2023    Please note that this dictation was created using voice recognition software. I have made every reasonable attempt to correct obvious errors, but it is possible there are errors of grammar and possibly content that I did not discover before finalizing the note.

## 2023-06-23 NOTE — CARE PLAN
Problem: Knowledge Deficit - Standard  Goal: Patient and family/care givers will demonstrate understanding of plan of care, disease process/condition, diagnostic tests and medications    6/22/2023 1844 by Roro Samson RNEREIDA  Outcome: Progressing   The patient is Watcher - Medium risk of patient condition declining or worsening    Shift Goals  Clinical Goals: cardiac workup  Patient Goals: Results/DC  Family Goals: NA    Progress made toward(s) clinical / shift goals:      Patient is not progressing towards the following goals:

## 2023-06-23 NOTE — PROGRESS NOTES
Report received Frantz . Pt up to floor from cath with TRAVIS Wagner. R wrist site hemoband,  no s/s of hematoma or bleeding, CMS intact. Monitor on pt.  Pt verbalized understanding to post op vitals and limiting wrist movement;  VSS, post sed vitals in place.

## 2023-06-23 NOTE — CARE PLAN
The patient is Watcher - Medium risk of patient condition declining or worsening    Shift Goals  Clinical Goals: cardiac workup  Patient Goals: Results/DC  Family Goals: NA    Progress made toward(s) clinical / shift goals:    Problem: Knowledge Deficit - Standard  Goal: Patient and family/care givers will demonstrate understanding of plan of care, disease process/condition, diagnostic tests and medications  Outcome: Progressing   Pt educated regarding plan of care and medications. All questions answered.      Patient is not progressing towards the following goals:

## 2023-06-23 NOTE — PROGRESS NOTES
Hospital Medicine Daily Progress Note    Date of Service  6/23/2023    Chief Complaint  Reinier Soria is a 57 y.o. male admitted 6/21/2023 with chest pain    Hospital Course  57-year-old male with history of diabetes uncontrolled, dyslipidemia, hypertension and obesity who presented 6/21 with chest pain.  Started couple days before the admission with the pressure chest pain, does not radiate, worsening with deep inspiration also moving.  Denied any fever or chills and no other symptoms.  On admission D-dimer was negative, troponin was in normal level and EKG did not show ischemic changes.  Patient was admitted to the hospital for chest pain work-up.  Telemetry and trending troponin did not show any acute finding and patient underwent stress test showed previous infarction, case was discussed with cardiology and planning for cath which showed severe mid LAD and stent was placed on 6/23.  Brilinta was added to aspirin, rosuvastatin was increased to 40 mg daily, patient is on lisinopril and metoprolol.  Echo was done during this hospitalization and did not show any wall motion abnormalities with normal ejection fraction 65%.      Patient has diabetes and A1c was 11, patient taking oral medications, discussed the importance of healthy diet and exercise, importance of close monitoring with encouraged the patient to check his blood sugar daily and follow-up with his PCP to adjust the medication if needed.    Interval Problem Update  -pt was evaluated and examined at bedside, no chest pain.  -Cardiac cath showed severe mid LAD disease and stent was placed.  -Continue aspirin and add Brilinta  -Increase rosuvastatin and increase insulin to 19 units since his blood sugar more than 200.  -Continue metoprolol and lisinopril and continue monitoring on telemetry      I have discussed this patient's plan of care and discharge plan at IDT rounds today with Case Management, Nursing, Nursing leadership, and other members of the  IDT team.    Consultants/Specialty  cardiology    Code Status  Full Code    Disposition  The patient is not medically cleared for discharge to home or a post-acute facility.  Anticipate discharge to: home with close outpatient follow-up    I have placed the appropriate orders for post-discharge needs.    Review of Systems  Review of Systems   Constitutional:  Negative for chills, fever and weight loss.   HENT:  Negative for ear pain, hearing loss and tinnitus.    Eyes:  Negative for blurred vision, double vision and photophobia.   Respiratory:  Negative for cough and hemoptysis.    Cardiovascular:  Negative for chest pain, palpitations, orthopnea and claudication.   Gastrointestinal:  Negative for abdominal pain, constipation, diarrhea, nausea and vomiting.   Genitourinary:  Negative for dysuria, frequency and urgency.   Musculoskeletal:  Negative for myalgias and neck pain.   Skin:  Negative for rash.   Neurological:  Negative for dizziness, speech change and weakness.        Physical Exam  Temp:  [36.1 °C (97 °F)-36.9 °C (98.5 °F)] 36.9 °C (98.5 °F)  Pulse:  [] 92  Resp:  [16-18] 18  BP: (103-135)/(65-83) 104/65  SpO2:  [90 %-92 %] 90 %    Physical Exam  Constitutional:       Appearance: He is not ill-appearing.   Eyes:      General: No scleral icterus.  Cardiovascular:      Rate and Rhythm: Normal rate.      Heart sounds: No murmur heard.  Pulmonary:      Effort: No respiratory distress.      Breath sounds: No wheezing.   Abdominal:      General: There is no distension.      Tenderness: There is no abdominal tenderness. There is no right CVA tenderness, left CVA tenderness or guarding.   Musculoskeletal:      Right lower leg: No edema.      Left lower leg: No edema.   Lymphadenopathy:      Cervical: No cervical adenopathy.   Skin:     Coloration: Skin is not jaundiced.      Findings: No bruising, lesion or rash.   Neurological:      General: No focal deficit present.      Mental Status: He is alert and  oriented to person, place, and time. Mental status is at baseline.      Cranial Nerves: No cranial nerve deficit.      Motor: No weakness.      Gait: Gait normal.         Fluids  No intake or output data in the 24 hours ending 06/23/23 1630    Laboratory  Recent Labs     06/21/23  1610 06/22/23  0002 06/23/23  0036   WBC 6.2 5.6 5.4   RBC 5.62 5.34 5.41   HEMOGLOBIN 17.2 15.7 16.5   HEMATOCRIT 49.7 47.3 47.2   MCV 88.4 88.6 87.2   MCH 30.6 29.4 30.5   MCHC 34.6 33.2 35.0   RDW 39.4 39.8 38.5   PLATELETCT 174 163* 155*   MPV 11.4 11.4 10.9     Recent Labs     06/21/23  1610 06/22/23  0002 06/23/23  0036   SODIUM 138 134* 136   POTASSIUM 4.4 4.0 4.0   CHLORIDE 103 100 101   CO2 21 23 23   GLUCOSE 342* 362* 280*   BUN 13 14 13   CREATININE 0.80 0.84 0.70   CALCIUM 10.5 9.9 9.1     Recent Labs     06/23/23  0036   INR 1.03         Recent Labs     06/22/23  0002   TRIGLYCERIDE 551*   HDL 25*   LDL see below       Imaging  EC-ECHOCARDIOGRAM COMPLETE W/O CONT   Final Result      NM-CARDIAC STRESS TEST   Final Result      US-EXTREMITY VENOUS LOWER BILAT   Final Result      DX-CHEST-PORTABLE (1 VIEW)   Final Result      Negative single view of the chest.      CL-LEFT HEART CATHETERIZATION WITH POSSIBLE INTERVENTION    (Results Pending)        Assessment/Plan  * Unstable angina (HCC)- (present on admission)  Assessment & Plan  EKG and trop did not show any acute finding   EKG showed perior infarction   Cardiac cath showed severe LAD disease, stent was placed   Echo did not show any wall motion abnormalities with normal ejection fraction 65%  Cont asa and add Brilinta  Increase rosuvastatin to 40 mg daily  Continue metoprolol 25 mg XL daily  Started lisinopril 5 mg daily  Tele and close monitoring         Lower extremity edema  Assessment & Plan  Echo is pending     Type 2 diabetes mellitus with diabetic polyneuropathy, without long-term current use of insulin (HCC)- (present on admission)  Assessment & Plan  Uncontrolled with  hyperglycemia  Continue home regimen  Follow-up with endocrinology  A1c11.4    Essential hypertension- (present on admission)  Assessment & Plan  Started with lisinopril and metoprolol            VTE prophylaxis: SCDs/TEDs and enoxaparin ppx    I have performed a physical exam and reviewed and updated ROS and Plan today (6/23/2023). In review of yesterday's note (6/22/2023), there are no changes except as documented above.

## 2023-06-24 ENCOUNTER — APPOINTMENT (OUTPATIENT)
Dept: RADIOLOGY | Facility: MEDICAL CENTER | Age: 58
DRG: 247 | End: 2023-06-24
Attending: INTERNAL MEDICINE
Payer: COMMERCIAL

## 2023-06-24 VITALS
WEIGHT: 274.25 LBS | RESPIRATION RATE: 16 BRPM | SYSTOLIC BLOOD PRESSURE: 108 MMHG | DIASTOLIC BLOOD PRESSURE: 84 MMHG | TEMPERATURE: 96.9 F | BODY MASS INDEX: 35.2 KG/M2 | OXYGEN SATURATION: 96 % | HEART RATE: 84 BPM | HEIGHT: 74 IN

## 2023-06-24 LAB
ANION GAP SERPL CALC-SCNC: 13 MMOL/L (ref 7–16)
BUN SERPL-MCNC: 15 MG/DL (ref 8–22)
CALCIUM SERPL-MCNC: 9.3 MG/DL (ref 8.5–10.5)
CHLORIDE SERPL-SCNC: 102 MMOL/L (ref 96–112)
CO2 SERPL-SCNC: 22 MMOL/L (ref 20–33)
CREAT SERPL-MCNC: 0.84 MG/DL (ref 0.5–1.4)
EKG IMPRESSION: NORMAL
ERYTHROCYTE [DISTWIDTH] IN BLOOD BY AUTOMATED COUNT: 38.6 FL (ref 35.9–50)
GFR SERPLBLD CREATININE-BSD FMLA CKD-EPI: 101 ML/MIN/1.73 M 2
GLUCOSE BLD STRIP.AUTO-MCNC: 274 MG/DL (ref 65–99)
GLUCOSE SERPL-MCNC: 262 MG/DL (ref 65–99)
HCT VFR BLD AUTO: 47.6 % (ref 42–52)
HGB BLD-MCNC: 16.3 G/DL (ref 14–18)
MCH RBC QN AUTO: 30 PG (ref 27–33)
MCHC RBC AUTO-ENTMCNC: 34.2 G/DL (ref 32.3–36.5)
MCV RBC AUTO: 87.5 FL (ref 81.4–97.8)
PLATELET # BLD AUTO: 165 K/UL (ref 164–446)
PMV BLD AUTO: 11.4 FL (ref 9–12.9)
POTASSIUM SERPL-SCNC: 4 MMOL/L (ref 3.6–5.5)
RBC # BLD AUTO: 5.44 M/UL (ref 4.7–6.1)
SODIUM SERPL-SCNC: 137 MMOL/L (ref 135–145)
WBC # BLD AUTO: 5.5 K/UL (ref 4.8–10.8)

## 2023-06-24 PROCEDURE — A9270 NON-COVERED ITEM OR SERVICE: HCPCS | Performed by: INTERNAL MEDICINE

## 2023-06-24 PROCEDURE — 93005 ELECTROCARDIOGRAM TRACING: CPT | Performed by: INTERNAL MEDICINE

## 2023-06-24 PROCEDURE — 700102 HCHG RX REV CODE 250 W/ 637 OVERRIDE(OP): Performed by: INTERNAL MEDICINE

## 2023-06-24 PROCEDURE — 85027 COMPLETE CBC AUTOMATED: CPT

## 2023-06-24 PROCEDURE — 97535 SELF CARE MNGMENT TRAINING: CPT

## 2023-06-24 PROCEDURE — 71045 X-RAY EXAM CHEST 1 VIEW: CPT

## 2023-06-24 PROCEDURE — 99239 HOSP IP/OBS DSCHRG MGMT >30: CPT | Performed by: INTERNAL MEDICINE

## 2023-06-24 PROCEDURE — 93010 ELECTROCARDIOGRAM REPORT: CPT | Performed by: INTERNAL MEDICINE

## 2023-06-24 PROCEDURE — 82962 GLUCOSE BLOOD TEST: CPT

## 2023-06-24 PROCEDURE — 80048 BASIC METABOLIC PNL TOTAL CA: CPT

## 2023-06-24 RX ORDER — ROSUVASTATIN CALCIUM 40 MG/1
40 TABLET, COATED ORAL EVERY EVENING
Qty: 30 TABLET | Refills: 11 | Status: SHIPPED | OUTPATIENT
Start: 2023-06-24 | End: 2023-07-07 | Stop reason: SDUPTHER

## 2023-06-24 RX ORDER — ASPIRIN 81 MG/1
81 TABLET ORAL DAILY
Qty: 100 TABLET | Refills: 4 | Status: SHIPPED | OUTPATIENT
Start: 2023-06-25 | End: 2023-11-13

## 2023-06-24 RX ORDER — LISINOPRIL 5 MG/1
5 TABLET ORAL DAILY
Qty: 90 TABLET | Refills: 4 | Status: SHIPPED | OUTPATIENT
Start: 2023-06-25 | End: 2023-07-07 | Stop reason: SDUPTHER

## 2023-06-24 RX ORDER — METOPROLOL SUCCINATE 25 MG/1
25 TABLET, EXTENDED RELEASE ORAL DAILY
Qty: 90 TABLET | Refills: 3 | Status: SHIPPED | OUTPATIENT
Start: 2023-06-25 | End: 2023-07-07 | Stop reason: SDUPTHER

## 2023-06-24 RX ADMIN — GEMFIBROZIL 600 MG: 600 TABLET ORAL at 06:30

## 2023-06-24 RX ADMIN — METOPROLOL SUCCINATE 25 MG: 25 TABLET, EXTENDED RELEASE ORAL at 05:06

## 2023-06-24 RX ADMIN — LISINOPRIL 5 MG: 10 TABLET ORAL at 05:05

## 2023-06-24 RX ADMIN — GABAPENTIN 300 MG: 300 CAPSULE ORAL at 05:06

## 2023-06-24 RX ADMIN — ASPIRIN 81 MG 81 MG: 81 TABLET ORAL at 05:07

## 2023-06-24 RX ADMIN — TICAGRELOR 90 MG: 90 TABLET ORAL at 05:07

## 2023-06-24 RX ADMIN — INSULIN HUMAN 5 UNITS: 100 INJECTION, SOLUTION PARENTERAL at 06:20

## 2023-06-24 ASSESSMENT — COGNITIVE AND FUNCTIONAL STATUS - GENERAL
MOBILITY SCORE: 24
SUGGESTED CMS G CODE MODIFIER MOBILITY: CH

## 2023-06-24 NOTE — CARE PLAN
The patient is Stable - Low risk of patient condition declining or worsening    Shift Goals  Clinical Goals: plan to dc  Patient Goals: plan to dc  Family Goals: rest    Progress made toward(s) clinical / shift goals:  pt cleared to dc     Patient is not progressing towards the following goals:      Problem: Knowledge Deficit - Standard  Goal: Patient and family/care givers will demonstrate understanding of plan of care, disease process/condition, diagnostic tests and medications  Description: Target End Date:  1-3 days or as soon as patient condition allows    Document in Patient Education    1.  Patient and family/caregiver oriented to unit, equipment, visitation policy and means for communicating concern  2.  Complete/review Learning Assessment  3.  Assess knowledge level of disease process/condition, treatment plan, diagnostic tests and medications  4.  Explain disease process/condition, treatment plan, diagnostic tests and medications  Outcome: Met     Problem: Psychosocial  Goal: Patient's level of anxiety will decrease  Description: Target End Date:  1-3 days or as soon as patient condition allows    1.  Collaborate with patient and family/caregiver to identify triggers and develop strategies to cope with anxiety  2.  Implement stimuli reduction, calming techniques  3.  Pharmacologic management per provider order  4.  Encourage patient/family/care giver participation  5.  Collaborate with interdisciplinary team including Psychologist or Behavioral Health Team as needed  Outcome: Met     Problem: Discharge Barriers/Planning  Goal: Patient's continuum of care needs are met  Description: Target End Date:  Prior to discharge or change in level of care    1.  Identify potential discharge barriers on admission and throughout hospitalization  2.  Collaborate with Case Management, , Clinical Educators, Navigators and others on the transitional care team to meet discharge needs  3.  Involve  patient/family/caregivers in setting and prioritizing goals for hospitalization and discharge  4.  Ensure Flu vaccinations are addressed  5.  Inquire if patient is interested in the Meds to Bed program  6.  Ensure patient and family/caregiver are able to demonstrate use of equipment as prescribed  7.  Ensure patient and family/caregiver can verbalize understanding of patient education  8.  Explain discharge instructions and medication reconciliation to patient and family/caregiver  Outcome: Met     Problem: Hemodynamics  Goal: Patient's hemodynamics, fluid balance and neurologic status will be stable or improve  Description: Target End Date:  Prior to discharge or change in level of care    Document on Assessment and I/O flowsheet templates    1.  Monitor vital signs, pulse oximetry and cardiac monitor per provider order and/or policy  2.  Maintain blood pressure per provider order  3.  Hemodynamic monitoring per provider order  4.  Manage IV fluids and IV infusions  5.  Monitor intake and output  6.  Daily weights per unit policy or provider order  7.  Assess peripheral pulses and capillary refill  8.  Assess color and body temperature  9.  Position patient for maximum circulation/cardiac output  10. Monitor for signs/symptoms of excessive bleeding  11. Assess mental status, restlessness and changes in level of consciousness  12. Monitor temperature and report fever or hypothermia to provider immediately. Consideration of targeted temperature management.  Outcome: Met     Problem: Mobility  Goal: Patient's capacity to carry out activities will improve  Description: Target End Date:  Prior to discharge or change in level of care    1.  Assess for barriers to mobility/activity  2.  Implement activity per interdisciplinary team recommendations  3.  Target activity level identified and patient/family/caregiver aware of goal  4.  Provide assistive devices  5.  Instruct patient/caregiver on proper use of  assistive/adaptive devices  6.  Schedule activities and rest periods to decrease effects of fatigue  7.  Encourage mobilization to extent of ability  8.  Maintain proper body alignment  9.  Provide adequate pain management to allow progressive mobilization  10. Implement pace maker precautions as needed  Outcome: Met     Problem: Self Care  Goal: Patient will have the ability to perform ADLs independently or with assistance (bathe, groom, dress, toilet and feed)  Description: Target End Date:  Prior to discharge or change in level of care    Document on ADL flowsheet    1.  Assess the capability and level of deficiency to perform ADLs  2.  Encourage family/care giver involvement  3.  Provide assistive devices  4.  Consider PT/OT evaluations  5.  Maintain support, give positive feedback, encourage self-care allowing extra time and verbal cuing as needed  6.  Avoid doing something for patients they can do themselves, but provide assistance as needed  7.  Assist in anticipating/planning individual needs  8.  Collaborate with Case Management and  to meet discharge needs  Outcome: Met     Problem: Pain - Standard  Goal: Alleviation of pain or a reduction in pain to the patient’s comfort goal  Description: Target End Date:  Prior to discharge or change in level of care    Document on Vitals flowsheet    1.  Document pain using the appropriate pain scale per order or unit policy  2.  Educate and implement non-pharmacologic comfort measures (i.e. relaxation, distraction, massage, cold/heat therapy, etc.)  3.  Pain management medications as ordered  4.  Reassess pain after pain med administration per policy  5.  If opiods administered assess patient's response to pain medication is appropriate per POSS sedation scale  6.  Follow pain management plan developed in collaboration with patient and interdisciplinary team (including palliative care or pain specialists if applicable)  Outcome: Met     Problem: Acute  Care of the Cardiac Cath Patient  Goal: Pre Procedure Optimal Outcome for the Cardiac Cath Patient  Outcome: Met

## 2023-06-24 NOTE — DISCHARGE SUMMARY
Discharge Summary    CHIEF COMPLAINT ON ADMISSION  Chief Complaint   Patient presents with    Chest Pain     Dull pain located on mid chest started 3 days ago. Also c/o SOB. Denies cardiac hx.       Reason for Admission  Chest pain      Admission Date  6/21/2023    CODE STATUS  Full Code    HPI & HOSPITAL COURSE    57-year-old male with history of diabetes uncontrolled, dyslipidemia, hypertension and obesity who presented 6/21 with chest pain.  Started couple days before the admission with the pressure chest pain, does not radiate, worsening with deep inspiration also moving.  Denied any fever or chills and no other symptoms.  On admission D-dimer was negative, troponin was in normal level and EKG did not show ischemic changes.  Patient was admitted to the hospital for chest pain work-up.  Telemetry and trending troponin did not show any acute finding and patient underwent stress test showed previous infarction, case was discussed with cardiology and planned for cath which showed severe mid LAD and stent was placed on 6/23.  Brilinta was added to aspirin, rosuvastatin was increased to 40 mg daily, Echo was done during this hospitalization and did not show any wall motion abnormalities with normal ejection fraction 65%.  Lisinopril 5 mg and metoprolol XL 25 mg daily were added.     Patient has dyslipidemia and triglyceride 550.  Since patient has coronary artery disease, patient needs high intensity statin, increase to atorvastatin 40 mg daily, discussed the risk of liver damage and muscles ache with the patient and discussed the importance of follow-up with PCP to repeat labs.     Patient has diabetes and A1c was 11, patient taking oral medications, encouraged the patient to increase the Lantus to 20 units daily.  Discussed the importance of healthy diet and exercise, importance of close monitoring with encouraged the patient to check his blood sugar daily and follow-up with his PCP to adjust the medication if  needed.    Plan of care was discussed in detail with the patient and his wife, answered all their questions, encouraged the patient to medications daily especially aspirin and Brilinta, encouraged him to check his blood pressure and blood sugar daily and follow-up closely with cardiologist and PCP.     Therefore, he is discharged in good and stable condition to home with close outpatient follow-up.    The patient met 2-midnight criteria for an inpatient stay at the time of discharge.    Discharge Date  06/24/23      FOLLOW UP ITEMS POST DISCHARGE  Follow-up with cardiologist for coronary artery disease follow-up with PCP for hypertension and diabetes  Follow-up with PCP for dyslipidemia and repeat lipid panel      DISCHARGE DIAGNOSES  Principal Problem:    Unstable angina (HCC) (POA: Yes)  Active Problems:    Essential hypertension (POA: Yes)    Type 2 diabetes mellitus with diabetic polyneuropathy, without long-term current use of insulin (HCC) (POA: Yes)    Lower extremity edema (POA: Unknown)  Resolved Problems:    * No resolved hospital problems. *      FOLLOW UP  [facility] End[facility]-Dr. Nell Tejeda - Diabetic patients  [address] [place] [address]  [phone]  Follow up in 1 week(s)      Mendoza MARCOS M.D.  [address]  [facility] [address]  [phone]    Follow up in 3 week(s)        MEDICATIONS ON DISCHARGE     Medication List        START taking these medications        Instructions   aspirin 81 MG EC tablet  Start taking on: June 25, 2023  Replaces: aspirin 81 MG Chew chewable tablet   Take 1 Tablet by mouth every day.  Dose: 81 mg     lisinopril 5 MG Tabs  Start taking on: June 25, 2023  Commonly known as: PRINIVIL   Take 1 Tablet by mouth every day.  Dose: 5 mg     metoprolol SR 25 MG Tb24  Start taking on: June 25, 2023  Commonly known as: TOPROL XL   Take 1 Tablet by mouth every day.  Dose: 25 mg     ticagrelor 90 MG Tabs tablet  Commonly known as: BRILINTA   Take 1 Tablet by  mouth 2 times a day.  Dose: 90 mg            CHANGE how you take these medications        Instructions   gemfibrozil 600 MG Tabs  What changed:   how much to take  how to take this  when to take this  Commonly known as: LOPID   TAKE ONE TABLET BY MOUTH TWICE DAILY     rosuvastatin 40 MG tablet  What changed:   medication strength  how much to take  Commonly known as: CRESTOR   Take 1 Tablet by mouth every evening.  Dose: 40 mg            CONTINUE taking these medications        Instructions   FreeStyle Rossy 2 Sensor Misc   CHANGE 1 SENSOR TO CHECK GLUCOSE ONCE EVERY 14 DAYS     gabapentin 300 MG Caps  Commonly known as: NEURONTIN   Take 300 mg by mouth 2 times a day.  Dose: 300 mg     Ozempic (1 MG/DOSE) 4 MG/3ML Sopn  Generic drug: Semaglutide (1 MG/DOSE)   INJECT 1MG SUBCUTANEOUSLY ONCE A WEEK     pioglitazone 30 MG Tabs  Commonly known as: ACTOS   Take 1 Tablet by mouth every day.  Dose: 1 Tablet     Synjardy XR 12.5-1000 MG Tb24  Generic drug: Empagliflozin-metFORMIN HCl ER   Take 2 Tablets by mouth every morning.  Dose: 2 Tablet     Toujeo SoloStar 300 UNIT/ML Sopn  Generic drug: Insulin Glargine (1 Unit Dial)   Inject 20 Units under the skin every evening.  Dose: 20 Units     vitamin D2 (Ergocalciferol) 1.25 MG (66180 UT) Caps capsule  Commonly known as: Drisdol   Take 50,000 Units by mouth every 7 days.  Dose: 50,000 Units            STOP taking these medications      aspirin 81 MG Chew chewable tablet  Commonly known as: ASA  Replaced by: aspirin 81 MG EC tablet              Allergies  Allergies   Allergen Reactions    Hctz      Leg cramps       DIET  Orders Placed This Encounter   Procedures    Diet Order Diet: Consistent CHO (Diabetic); Second Modifier: (optional): Cardiac     Standing Status:   Standing     Number of Occurrences:   1     Order Specific Question:   Diet:     Answer:   Consistent CHO (Diabetic) [4]     Order Specific Question:   Second Modifier: (optional)     Answer:   Cardiac [6]        ACTIVITY  As tolerated.  Weight bearing as tolerated    CONSULTATIONS  Cardio     PROCEDURES  PCI and stent placement     LABORATORY  Lab Results   Component Value Date    SODIUM 137 06/24/2023    POTASSIUM 4.0 06/24/2023    CHLORIDE 102 06/24/2023    CO2 22 06/24/2023    GLUCOSE 262 (H) 06/24/2023    BUN 15 06/24/2023    CREATININE 0.84 06/24/2023        Lab Results   Component Value Date    WBC 5.5 06/24/2023    HEMOGLOBIN 16.3 06/24/2023    HEMATOCRIT 47.6 06/24/2023    PLATELETCT 165 06/24/2023        Total time of the discharge process exceeds 34 minutes.

## 2023-06-24 NOTE — THERAPY
Physical Therapy   Initial Evaluation     Patient Name: Reinier Soria  Age:  57 y.o., Sex:  male  Medical Record #: [id number]  Today's Date: 6/24/2023     Precautions  Precautions: (P) Fall Risk    Assessment  Patient is 57 y.o. male with a diagnosis of chest pain s/p cardiac catheterization with stent placement 6/23/23. PMH includes DM2, DLD, HTN. Pt reports independence with self care and mobility at baseline sans AD. Pt and RN are reporting pt is moving well with ambulation and transfers. PT today focused on cardiac activity and RPE education and handout for which pt was receptive. Anticipate pt to be able to D/C home without further PT needs.    Plan    Physical Therapy Initial Treatment Plan   Duration: (P) Evaluation only    DC Equipment Recommendations: (P) None  Discharge Recommendations: (P) Anticipate that the patient will have no further physical therapy needs after discharge from the hospital       Subjective    Pt reporting feeling good today with no concerns about mobility upon D/C home.     Objective       06/24/23 0910   Precautions   Precautions Fall Risk   Prior Living Situation   Housing / Facility Motor Home   Lives with - Patient's Self Care Capacity Spouse   Prior Level of Functional Mobility   Bed Mobility Independent   Transfer Status Independent   Ambulation Independent   Ambulation Distance community   Assistive Devices Used None   Stairs Independent   History of Falls   History of Falls   (unknown)   Cognition    Cognition / Consciousness WDL   Other Treatments   Other Treatments Provided cardiac activity handout, pursed lip breathing and breath recovery   Gait Analysis   Comments pt and RN report pt is up independent with no difficulty   How much difficulty does the patient currently have...   Turning over in bed (including adjusting bedclothes, sheets and blankets)? 4   Sitting down on and standing up from a chair with arms (e.g., wheelchair, bedside commode, etc.) 4   Moving from  lying on back to sitting on the side of the bed? 4   How much help from another person does the patient currently need...   Moving to and from a bed to a chair (including a wheelchair)? 4   Need to walk in a hospital room? 4   Climbing 3-5 steps with a railing? 4   6 clicks Mobility Score 24   Activity Tolerance   Sitting in Chair NT   Sitting Edge of Bed NT   Standing NT   Education Group   Education Provided Role of Physical Therapist   Role of Physical Therapist Patient Response Patient;Significant Other;Acceptance;Explanation;Verbal Demonstration   Additional Comments cardiac activity handout and RPE   Physical Therapy Initial Treatment Plan    Duration Evaluation only   Problem List    Problems Decreased Activity Tolerance   Anticipated Discharge Equipment and Recommendations   DC Equipment Recommendations None   Discharge Recommendations Anticipate that the patient will have no further physical therapy needs after discharge from the hospital

## 2023-06-24 NOTE — CARE PLAN
The patient is Watcher - Medium risk of patient condition declining or worsening    Shift Goals  Clinical Goals: monitor access site  Patient Goals: rest  Family Goals: rest    Progress made toward(s) clinical / shift goals:    Problem: Knowledge Deficit - Standard  Goal: Patient and family/care givers will demonstrate understanding of plan of care, disease process/condition, diagnostic tests and medications  Description: Target End Date:  6/24/2023    Document in Patient Education    1.  Patient and family/caregiver oriented to unit, equipment, visitation policy and means for communicating concern  2.  Complete/review Learning Assessment  3.  Assess knowledge level of disease process/condition, treatment plan, diagnostic tests and medications  4.  Explain disease process/condition, treatment plan, diagnostic tests and medications  Outcome: Progressing     Problem: Hemodynamics  Goal: Patient's hemodynamics, fluid balance and neurologic status will be stable or improve  Description: Target End Date:  6/24/2023    Document on Assessment and I/O flowsheet templates    1.  Monitor vital signs, pulse oximetry and cardiac monitor per provider order and/or policy  2.  Maintain blood pressure per provider order  3.  Hemodynamic monitoring per provider order  4.  Manage IV fluids and IV infusions  5.  Monitor intake and output  6.  Daily weights per unit policy or provider order  7.  Assess peripheral pulses and capillary refill  8.  Assess color and body temperature  9.  Position patient for maximum circulation/cardiac output  10. Monitor for signs/symptoms of excessive bleeding  11. Assess mental status, restlessness and changes in level of consciousness  12. Monitor temperature and report fever or hypothermia to provider immediately. Consideration of targeted temperature management.  Outcome: Progressing     Problem: Mobility  Goal: Patient's capacity to carry out activities will improve  Description: Target End Date:   6/24/2023    1.  Assess for barriers to mobility/activity  2.  Implement activity per interdisciplinary team recommendations  3.  Target activity level identified and patient/family/caregiver aware of goal  4.  Provide assistive devices  5.  Instruct patient/caregiver on proper use of assistive/adaptive devices  6.  Schedule activities and rest periods to decrease effects of fatigue  7.  Encourage mobilization to extent of ability  8.  Maintain proper body alignment  9.  Provide adequate pain management to allow progressive mobilization  10. Implement pace maker precautions as needed  Outcome: Progressing

## 2023-06-24 NOTE — PROGRESS NOTES
Pt cleared by cardiology and OT, ambulatory, dressing site clean dry and intact, tele box and IV removed. Pt educated on dc instructions and medications - dc to home with wife

## 2023-06-26 ENCOUNTER — TELEPHONE (OUTPATIENT)
Dept: CARDIOLOGY | Facility: MEDICAL CENTER | Age: 58
End: 2023-06-26
Payer: COMMERCIAL

## 2023-06-26 NOTE — TELEPHONE ENCOUNTER
Spoke with pt and confirmed this will be their first time seeing a cardiologist. Confirmed that all recent labs, notes, and cardiac testing are in pts chart. Appointment time, date, and location confirmed with patient.

## 2023-06-26 NOTE — TELEPHONE ENCOUNTER
ADD            Caller:  Reinier Soria      Topic/issue: Patient was discharged from the hospital Saturday morning and noticed there was rash that was showing under his armpits. He started new medications when he left the hospital and he was asking for a call back to discuss them      Callback Number: [phone]      Thank you    -Lucas ONEIL

## 2023-07-03 NOTE — TELEPHONE ENCOUNTER
Phone Number Called: [phone]    Call outcome: Spoke to patient regarding message below.    Message: Called to return patient's phone call.     Patient reports rash has disappeared. Patient reports he has shortness of breath with climbing stairs, walking and talking, laying on your left side. Patient reports that he is short of breath even with speaking. Patient reports that he is having leg swelling. He lost 8lbs in the hospital, but has not gained any weight. Patient reports blood pressure 111/80, pulse ox 97%, HR 95. Patient reports that he is peeing normally as well.     Patient went to ER back on 6/21/23. Multiple tests performed. Patient was told tests are normal.     RN to reach out to  as patient is seeing her in on 7/7/23.     ER precautions advised. Verbalized understanding. No further questions at this time.

## 2023-07-05 NOTE — TELEPHONE ENCOUNTER
Phone Number Called: [phone]    Call outcome: Spoke to patient regarding message below.    Message: Called to inform patient of  recommendations.     Patient verbalized understanding and reports Shortness of breath is a little better today but will go to the ER if symptoms worsen. No further questions at this time.

## 2023-07-05 NOTE — TELEPHONE ENCOUNTER
Thank you for the update Lida, can you please call this patient today and see how he is doing?  If he is still experiencing significant shortness of breath I do recommend that he go to the ER.  Thank you so much

## 2023-07-07 ENCOUNTER — TELEPHONE (OUTPATIENT)
Dept: CARDIOLOGY | Facility: MEDICAL CENTER | Age: 58
End: 2023-07-07

## 2023-07-07 ENCOUNTER — ANCILLARY PROCEDURE (OUTPATIENT)
Dept: CARDIOLOGY | Facility: MEDICAL CENTER | Age: 58
End: 2023-07-07
Payer: COMMERCIAL

## 2023-07-07 ENCOUNTER — OFFICE VISIT (OUTPATIENT)
Dept: CARDIOLOGY | Facility: MEDICAL CENTER | Age: 58
End: 2023-07-07
Payer: COMMERCIAL

## 2023-07-07 VITALS
SYSTOLIC BLOOD PRESSURE: 102 MMHG | BODY MASS INDEX: 33.62 KG/M2 | DIASTOLIC BLOOD PRESSURE: 64 MMHG | OXYGEN SATURATION: 94 % | HEIGHT: 74 IN | RESPIRATION RATE: 16 BRPM | HEART RATE: 80 BPM | WEIGHT: 262 LBS

## 2023-07-07 DIAGNOSIS — Z95.5 HISTORY OF PLACEMENT OF STENT IN LAD CORONARY ARTERY: ICD-10-CM

## 2023-07-07 DIAGNOSIS — I25.110 CORONARY ARTERY DISEASE INVOLVING NATIVE CORONARY ARTERY OF NATIVE HEART WITH UNSTABLE ANGINA PECTORIS (HCC): ICD-10-CM

## 2023-07-07 DIAGNOSIS — E78.1 HYPERTRIGLYCERIDEMIA: ICD-10-CM

## 2023-07-07 DIAGNOSIS — I10 ESSENTIAL HYPERTENSION: ICD-10-CM

## 2023-07-07 DIAGNOSIS — R06.02 SHORTNESS OF BREATH: ICD-10-CM

## 2023-07-07 DIAGNOSIS — I25.118 CORONARY ARTERY DISEASE OF NATIVE ARTERY WITH STABLE ANGINA PECTORIS, UNSPECIFIED WHETHER NATIVE OR TRANSPLANTED HEART (HCC): ICD-10-CM

## 2023-07-07 PROBLEM — I20.0 UNSTABLE ANGINA (HCC): Status: RESOLVED | Noted: 2023-06-21 | Resolved: 2023-07-07

## 2023-07-07 LAB
LV EJECT FRACT  99904: 60
LV EJECT FRACT MOD 2C 99903: 51.95
LV EJECT FRACT MOD 4C 99902: 56.64
LV EJECT FRACT MOD BP 99901: 55.24

## 2023-07-07 PROCEDURE — 3074F SYST BP LT 130 MM HG: CPT

## 2023-07-07 PROCEDURE — 3078F DIAST BP <80 MM HG: CPT

## 2023-07-07 PROCEDURE — 99214 OFFICE O/P EST MOD 30 MIN: CPT

## 2023-07-07 PROCEDURE — 99213 OFFICE O/P EST LOW 20 MIN: CPT

## 2023-07-07 PROCEDURE — 93306 TTE W/DOPPLER COMPLETE: CPT

## 2023-07-07 PROCEDURE — 99212 OFFICE O/P EST SF 10 MIN: CPT

## 2023-07-07 PROCEDURE — 93306 TTE W/DOPPLER COMPLETE: CPT | Mod: 26 | Performed by: INTERNAL MEDICINE

## 2023-07-07 RX ORDER — METOPROLOL SUCCINATE 25 MG/1
25 TABLET, EXTENDED RELEASE ORAL DAILY
Qty: 100 TABLET | Refills: 3 | Status: SHIPPED | OUTPATIENT
Start: 2023-07-07 | End: 2023-11-13

## 2023-07-07 RX ORDER — LISINOPRIL 5 MG/1
5 TABLET ORAL DAILY
Qty: 100 TABLET | Refills: 3 | Status: SHIPPED | OUTPATIENT
Start: 2023-07-07 | End: 2023-11-13

## 2023-07-07 RX ORDER — ROSUVASTATIN CALCIUM 40 MG/1
40 TABLET, COATED ORAL EVERY EVENING
Qty: 100 TABLET | Refills: 3 | Status: SHIPPED | OUTPATIENT
Start: 2023-07-07 | End: 2023-11-13

## 2023-07-07 ASSESSMENT — ENCOUNTER SYMPTOMS
PALPITATIONS: 0
GASTROINTESTINAL NEGATIVE: 1
PND: 0
MUSCULOSKELETAL NEGATIVE: 1
EYES NEGATIVE: 1
DEPRESSION: 0
NEUROLOGICAL NEGATIVE: 1
SHORTNESS OF BREATH: 1
ORTHOPNEA: 0
NERVOUS/ANXIOUS: 0
CONSTITUTIONAL NEGATIVE: 1

## 2023-07-07 ASSESSMENT — FIBROSIS 4 INDEX: FIB4 SCORE: 1.34

## 2023-07-07 NOTE — TELEPHONE ENCOUNTER
Pt is requesting completion of FMLA forms. I have scanned them into his chart.  The [employer] form has also been scanned into his chart.

## 2023-07-07 NOTE — PROGRESS NOTES
Chief Complaint   Patient presents with    Hypertension    Chest Pain       Subjective     Leighton Soria is a 57 y.o. male who presents today for hospital follow up. They have a history of diabetes uncontrolled, dyslipidemia, hypertension and obesity.    He was admitted from 6/21/2023 to 6/24/2023 for chest pain troponin negative, EKG did not show ischemic changes, stress testing showed previous infarction, angiogram showed severe mid LAD stenosis and a stent was placed on 6/23/2023.  Patient was started on aspirin, Brilinta, rosuvastatin.  Echo demonstrated ejection fraction of 65%    No known family history, patient is adopted.    They are accompanied today by wife Casie    They are a new patient to our practice.    They have been feeling ongoing shortness of breath with minimal activity. Mild lower extremity edema, denies chest pain or palpitations.    Diet: He has been eating healthier since his hospitalization, occasional fast food and eating out    ETOH: rare, Tobacco: quit on April 1st (previous vaping use and, 35 year pack history), Recreational drugs: none, Caffeine: about 4-8 cups per day        Past Medical History:   Diagnosis Date    Chicken pox     Diabetes mellitus type 2, uncontrolled 12/16/2011    Hypertension 4/21/2010    Increased BMI 4/21/2010     Past Surgical History:   Procedure Laterality Date    AMPUTATION, TOE Left 12/30/2022    2nd toe    VENTRAL HERNIA REPAIR LAPAROSCOPIC  04/10/2015    Performed by Sadiq Ortega M.D. at SURGERY [facility]    AMPUTATION, TOE  01/01/2006    left large toe    INGUINAL HERNIA REPAIR BILATERAL  01/01/1992    repaired twice    TONSILLECTOMY       Family History   Adopted: Yes     Social History     Socioeconomic History    Marital status:      Spouse name: Not on file    Number of children: Not on file    Years of education: Not on file    Highest education level: Not on file   Occupational History    Not on file   Tobacco Use    Smoking status:  Former     Packs/day: 1.00     Years: 30.00     Pack years: 30.00     Types: Cigarettes     Quit date: 2014     Years since quittin.9    Smokeless tobacco: Former     Quit date: 1982   Vaping Use    Vaping Use: Former    Quit date: 2023    Substances: Nicotine   Substance and Sexual Activity    Alcohol use: Yes     Comment: rare    Drug use: No    Sexual activity: Yes     Partners: Male, Female   Other Topics Concern    Not on file   Social History Narrative    Not on file     Social Determinants of Health     Financial Resource Strain: Not on file   Food Insecurity: Not on file   Transportation Needs: Not on file   Physical Activity: Not on file   Stress: Not on file   Social Connections: Not on file   Intimate Partner Violence: Not on file   Housing Stability: Not on file     Allergies   Allergen Reactions    Hctz      Leg cramps     Outpatient Encounter Medications as of 2023   Medication Sig Dispense Refill    aspirin 81 MG EC tablet Take 1 Tablet by mouth every day. 100 Tablet 4    rosuvastatin (CRESTOR) 40 MG tablet Take 1 Tablet by mouth every evening. 30 Tablet 11    lisinopril (PRINIVIL) 5 MG Tab Take 1 Tablet by mouth every day. 90 Tablet 4    metoprolol SR (TOPROL XL) 25 MG TABLET SR 24 HR Take 1 Tablet by mouth every day. 90 Tablet 3    ticagrelor (BRILINTA) 90 MG Tab tablet Take 1 Tablet by mouth 2 times a day. 90 Tablet 8    Continuous Blood Gluc Sensor (FREESTYLE MAKEDA 2 SENSOR) [facility] [place] 1 SENSOR TO CHECK GLUCOSE ONCE EVERY 14 DAYS      vitamin D2, Ergocalciferol, (DRISDOL) 1.25 MG (71224 UT) Cap capsule Take 50,000 Units by mouth every 7 days.      gabapentin (NEURONTIN) 300 MG Cap Take 300 mg by mouth 2 times a day.      TOUJEO SOLOSTAR 300 UNIT/ML Solution Pen-injector Inject 20 Units under the skin every evening.      pioglitazone (ACTOS) 30 MG Tab Take 1 Tablet by mouth every day.      OZEMPIC, 1 MG/DOSE, 4 MG/3ML Solution Pen-injector INJECT 1MG SUBCUTANEOUSLY ONCE A WEEK   "    Empagliflozin-metFORMIN HCl ER (SYNJARDY XR) 12.5-1000 MG TABLET SR 24 HR Take 2 Tablets by mouth every morning.      gemfibrozil (LOPID) 600 MG Tab TAKE ONE TABLET BY MOUTH TWICE DAILY (Patient not taking: Reported on 2023) 180 Tab 1     No facility-administered encounter medications on file as of 2023.     Review of Systems   Constitutional: Negative.    HENT: Negative.     Eyes: Negative.    Respiratory:  Positive for shortness of breath.    Cardiovascular:  Positive for leg swelling. Negative for chest pain, palpitations, orthopnea and PND.   Gastrointestinal: Negative.    Genitourinary: Negative.    Musculoskeletal: Negative.    Skin: Negative.    Neurological: Negative.    Endo/Heme/Allergies: Negative.    Psychiatric/Behavioral:  Negative for depression. The patient is not nervous/anxious.               Objective     /64 (BP Location: Left arm, Patient Position: Sitting, BP Cuff Size: Adult)   Pulse 80   Resp 16   Ht 1.88 m (6' 2\")   Wt 119 kg (262 lb)   SpO2 94%   BMI 33.64 kg/m²     Physical Exam  Constitutional:       Appearance: Normal appearance. He is obese.   HENT:      Head: Normocephalic.   Neck:      Vascular: No JVD.   Cardiovascular:      Rate and Rhythm: Normal rate and regular rhythm.      Pulses: Normal pulses.      Heart sounds: Normal heart sounds. No murmur heard.     No friction rub.   Pulmonary:      Effort: Pulmonary effort is normal.      Breath sounds: Normal breath sounds.   Abdominal:      Palpations: Abdomen is soft.   Musculoskeletal:         General: Normal range of motion.      Right lower le+ Edema present.      Left lower le+ Edema present.   Skin:     General: Skin is warm and dry.   Neurological:      General: No focal deficit present.      Mental Status: He is alert and oriented to person, place, and time.   Psychiatric:         Mood and Affect: Mood normal.         Behavior: Behavior normal.            Lab Results   Component Value Date/Time "    CHOLSTRLTOT 159 06/22/2023 12:02 AM    LDL see below 06/22/2023 12:02 AM    HDL 25 (A) 06/22/2023 12:02 AM    TRIGLYCERIDE 551 (H) 06/22/2023 12:02 AM       Lab Results   Component Value Date/Time    SODIUM 137 06/24/2023 12:05 AM    POTASSIUM 4.0 06/24/2023 12:05 AM    CHLORIDE 102 06/24/2023 12:05 AM    CO2 22 06/24/2023 12:05 AM    GLUCOSE 262 (H) 06/24/2023 12:05 AM    BUN 15 06/24/2023 12:05 AM    CREATININE 0.84 06/24/2023 12:05 AM    BUNCREATRAT 19 12/31/2018 07:45 AM     Lab Results   Component Value Date/Time    ALKPHOSPHAT 84 06/22/2023 12:02 AM    ASTSGOT 23 06/22/2023 12:02 AM    ALTSGPT 35 06/22/2023 12:02 AM    TBILIRUBIN 0.5 06/22/2023 12:02 AM          Echocardiogram 6/22/2023  CONCLUSIONS  No prior study is available for comparison.   The left ventricular ejection fraction is estimated to be 65%.  No significant valvular abnormalities.     Angiogram 6/23/2023  CORONARY ANGIOGRAPHY:  The left main coronary artery: Large-caliber vessel with mild CAD, bifurcates to LAD and left circumflex  The left anterior descending coronary artery: Large caliber transapical vessel with severe 70-80% mid LAD stenosis status post PCI as detailed below.  There is also mid LAD myocardial bridge after the second diagonal branch.  Gives rise to 2 diagonal branches with luminal irregularities.  The left circumflex coronary artery: Large-caliber vessel with bifurcating OM and diffuse luminal irregularities  The right coronary artery: Moderate caliber dominant vessel with luminal irregularities    IMPRESSION:  1.  Severe mid LAD disease status post successful IVUS guided PCI deploying Synergy 4 x 24 mm CALE (postdilated to 4.7 mm proximally)  2.  High-normal resting LVEDP 16 mmHg      Assessment & Plan     1. History of placement of stent in LAD coronary artery  EC-ECHOCARDIOGRAM COMPLETE W/O CONT      2. Coronary artery disease involving native coronary artery of native heart with unstable angina pectoris (HCC)        3.  Essential hypertension        4. Hypertriglyceridemia        5. Shortness of breath  EC-ECHOCARDIOGRAM COMPLETE W/O CONT          Medical Decision Making: Today's Assessment/Status/Plan:        Coronary Artery Disease  S/p stent to LAD on 6/23/2023  -DAPT with ASA, Ticagrelor (Brilinta) for 12 months  -Rosuvastatin 40 mg daily  -BB Toprol 25 mg daily  -lisinopril 5 mg daily  -Meds-to-beds on discharge  -EF 65%  -Cardiac rehab referral   Education provided  -Discussed returning to ER if chest pain returns and/or call cardiology office at [phone] with any additional new or worsening symptoms  -Discussed CV risk factor modification including cholesterol management, blood pressure management, smoking cessation, diet and lifestyle changes.  -he is having class III symptoms, shortness of breath with mild physical activity, ordered stat echocardiogram    Hypertension  -Good control  -Continue regimen as outlined above  - goal < 130/80  - check BP daily 2 hours after taking medication and keep log of measurements     Hyperlipidemia  -Most recent LDL unable to calculate due to hypertriglyceridemia  -Continue simvastatin 40 mg daily  -Goal of less than 70  -Check lipid panel in 3 months    Follow up with [facility] in 1 month after echo    This note was dictated using Dragon speech recognition software.

## 2023-07-10 ENCOUNTER — PATIENT MESSAGE (OUTPATIENT)
Dept: CARDIOLOGY | Facility: MEDICAL CENTER | Age: 58
End: 2023-07-10
Payer: COMMERCIAL

## 2023-07-10 NOTE — TELEPHONE ENCOUNTER
IKER Hines.  You 3 days ago     EH  Yes please, I thought those got sent over to [facility] ... But thank you

## 2023-07-11 ENCOUNTER — TELEPHONE (OUTPATIENT)
Dept: CARDIOLOGY | Facility: MEDICAL CENTER | Age: 58
End: 2023-07-11
Payer: COMMERCIAL

## 2023-07-11 NOTE — TELEPHONE ENCOUNTER
FMLA paperwork faxed to number provided.     Completed forms and confirmation of fax scanned into chart via [employer]    Pt notified via [employer] of complete paperwork available to  in front office

## 2023-07-14 ENCOUNTER — PATIENT MESSAGE (OUTPATIENT)
Dept: CARDIOLOGY | Facility: MEDICAL CENTER | Age: 58
End: 2023-07-14
Payer: COMMERCIAL

## 2023-07-17 ENCOUNTER — PATIENT MESSAGE (OUTPATIENT)
Dept: CARDIOLOGY | Facility: MEDICAL CENTER | Age: 58
End: 2023-07-17
Payer: COMMERCIAL

## 2023-07-17 NOTE — TELEPHONE ENCOUNTER
Ya lets get a fresh copy, I don't know who prepped his paperwork last time but maybe we can make a note of this so that we don't do the same thing again. We will just have to put the return date as TBD.

## 2023-07-18 ENCOUNTER — TELEPHONE (OUTPATIENT)
Dept: CARDIOLOGY | Facility: MEDICAL CENTER | Age: 58
End: 2023-07-18
Payer: COMMERCIAL

## 2023-07-18 NOTE — TELEPHONE ENCOUNTER
PT dropped off new Return to Work Paper requested per [employer].    New paperwork and walk-in form is scanned into media.    PT is also wondering whether or not it is necessary to come to both of his upcoming apts. Please advise.     Addendum not available at this time.

## 2023-07-19 ENCOUNTER — PATIENT MESSAGE (OUTPATIENT)
Dept: CARDIOLOGY | Facility: MEDICAL CENTER | Age: 58
End: 2023-07-19
Payer: COMMERCIAL

## 2023-07-20 ENCOUNTER — OFFICE VISIT (OUTPATIENT)
Dept: CARDIOLOGY | Facility: MEDICAL CENTER | Age: 58
End: 2023-07-20
Payer: COMMERCIAL

## 2023-07-20 VITALS
DIASTOLIC BLOOD PRESSURE: 66 MMHG | WEIGHT: 236 LBS | OXYGEN SATURATION: 96 % | HEIGHT: 74 IN | SYSTOLIC BLOOD PRESSURE: 102 MMHG | RESPIRATION RATE: 16 BRPM | HEART RATE: 83 BPM | BODY MASS INDEX: 30.29 KG/M2

## 2023-07-20 DIAGNOSIS — R60.0 LOWER EXTREMITY EDEMA: ICD-10-CM

## 2023-07-20 DIAGNOSIS — E78.1 HYPERTRIGLYCERIDEMIA: ICD-10-CM

## 2023-07-20 DIAGNOSIS — E11.42 TYPE 2 DIABETES MELLITUS WITH DIABETIC POLYNEUROPATHY, WITHOUT LONG-TERM CURRENT USE OF INSULIN (HCC): ICD-10-CM

## 2023-07-20 DIAGNOSIS — I25.110 CORONARY ARTERY DISEASE INVOLVING NATIVE CORONARY ARTERY OF NATIVE HEART WITH UNSTABLE ANGINA PECTORIS (HCC): ICD-10-CM

## 2023-07-20 DIAGNOSIS — R06.02 SHORTNESS OF BREATH: ICD-10-CM

## 2023-07-20 DIAGNOSIS — I10 ESSENTIAL HYPERTENSION: ICD-10-CM

## 2023-07-20 DIAGNOSIS — Z95.5 HISTORY OF PLACEMENT OF STENT IN LAD CORONARY ARTERY: ICD-10-CM

## 2023-07-20 PROCEDURE — 3078F DIAST BP <80 MM HG: CPT

## 2023-07-20 PROCEDURE — 99213 OFFICE O/P EST LOW 20 MIN: CPT

## 2023-07-20 PROCEDURE — 99214 OFFICE O/P EST MOD 30 MIN: CPT

## 2023-07-20 PROCEDURE — 3074F SYST BP LT 130 MM HG: CPT

## 2023-07-20 PROCEDURE — 99212 OFFICE O/P EST SF 10 MIN: CPT

## 2023-07-20 RX ORDER — PRASUGREL 10 MG/1
10 TABLET, FILM COATED ORAL DAILY
Qty: 90 TABLET | Refills: 3 | Status: SHIPPED | OUTPATIENT
Start: 2023-07-20 | End: 2023-11-13

## 2023-07-20 RX ORDER — POTASSIUM CHLORIDE 20 MEQ/1
20 TABLET, EXTENDED RELEASE ORAL
Qty: 30 TABLET | Refills: 11 | Status: SHIPPED | OUTPATIENT
Start: 2023-07-20 | End: 2023-11-13

## 2023-07-20 RX ORDER — FUROSEMIDE 20 MG/1
20 TABLET ORAL 2 TIMES DAILY PRN
Qty: 30 TABLET | Refills: 11 | Status: SHIPPED | OUTPATIENT
Start: 2023-07-20 | End: 2023-11-13

## 2023-07-20 ASSESSMENT — FIBROSIS 4 INDEX: FIB4 SCORE: 1.34

## 2023-07-20 ASSESSMENT — ENCOUNTER SYMPTOMS
EYES NEGATIVE: 1
ORTHOPNEA: 0
MUSCULOSKELETAL NEGATIVE: 1
SHORTNESS OF BREATH: 1
CONSTITUTIONAL NEGATIVE: 1
GASTROINTESTINAL NEGATIVE: 1
DEPRESSION: 0
PND: 0
NEUROLOGICAL NEGATIVE: 1
NERVOUS/ANXIOUS: 0
PALPITATIONS: 0

## 2023-07-20 NOTE — PROGRESS NOTES
Chief Complaint   Patient presents with    Shortness of Breath    Hypertension     F/V Dx: Essential hypertension       Subjective     Leighton Soria is a 57 y.o. male who presents today for follow up of his shortness of breath and leg swelling. They have a history of diabetes uncontrolled, dyslipidemia, hypertension and obesity.     He was admitted from 6/21/2023 to 6/24/2023 for chest pain troponin negative, EKG did not show ischemic changes, stress testing showed previous infarction, angiogram showed severe mid LAD stenosis and a stent was placed on 6/23/2023.  Patient was started on aspirin, Brilinta, rosuvastatin.  Echo demonstrated ejection fraction of 65%     No known family history, patient is adopted.     They are accompanied today by wife Casie     They are a new patient to our practice.     They have been feeling ongoing shortness of breath with minimal activity. Mild lower extremity edema, denies chest pain or palpitations.     Diet: He has been eating healthier since his hospitalization, occasional fast food and eating out     ETOH: rare, Tobacco: quit on April 1st (previous vaping use and, 35 year pack history), Recreational drugs: none, Caffeine: about 4-8 cups per day    Past Medical History:   Diagnosis Date    Chicken pox     Diabetes mellitus type 2, uncontrolled 12/16/2011    Hypertension 4/21/2010    Increased BMI 4/21/2010     Past Surgical History:   Procedure Laterality Date    AMPUTATION, TOE Left 12/30/2022    2nd toe    VENTRAL HERNIA REPAIR LAPAROSCOPIC  04/10/2015    Performed by Sadiq Ortega M.D. at SURGERY [facility] ORS    AMPUTATION, TOE  01/01/2006    left large toe    INGUINAL HERNIA REPAIR BILATERAL  01/01/1992    repaired twice    TONSILLECTOMY       Family History   Adopted: Yes     Social History     Socioeconomic History    Marital status:      Spouse name: Not on file    Number of children: Not on file    Years of education: Not on file    Highest education level:  Not on file   Occupational History    Not on file   Tobacco Use    Smoking status: Former     Packs/day: 1.00     Years: 30.00     Pack years: 30.00     Types: Cigarettes     Quit date: 2014     Years since quittin.9    Smokeless tobacco: Former     Quit date: 1982   Vaping Use    Vaping Use: Former    Quit date: 2023    Substances: Nicotine   Substance and Sexual Activity    Alcohol use: Yes     Comment: rare    Drug use: No    Sexual activity: Yes     Partners: Male, Female   Other Topics Concern    Not on file   Social History Narrative    Not on file     Social Determinants of Health     Financial Resource Strain: Not on file   Food Insecurity: Not on file   Transportation Needs: Not on file   Physical Activity: Not on file   Stress: Not on file   Social Connections: Not on file   Intimate Partner Violence: Not on file   Housing Stability: Not on file     Allergies   Allergen Reactions    Hctz      Leg cramps     Outpatient Encounter Medications as of 2023   Medication Sig Dispense Refill    HUMALOG KWIKPEN 100 UNIT/ML Solution Pen-injector injection PEN INJECT 6 TO 10 UNITS SUBCUTANEOUSLY ONCE DAILY WITH MEALS      OZEMPIC, 2 MG/DOSE, 8 MG/3ML Solution Pen-injector       prasugrel (EFFIENT) 10 MG Tab Take 1 Tablet by mouth every day. 90 Tablet 3    lisinopril (PRINIVIL) 5 MG Tab Take 1 Tablet by mouth every day. 100 Tablet 3    metoprolol SR (TOPROL XL) 25 MG TABLET SR 24 HR Take 1 Tablet by mouth every day. 100 Tablet 3    rosuvastatin (CRESTOR) 40 MG tablet Take 1 Tablet by mouth every evening. 100 Tablet 3    aspirin 81 MG EC tablet Take 1 Tablet by mouth every day. 100 Tablet 4    Continuous Blood Gluc Sensor (FREESTYLE MAKEDA 2 SENSOR) [facility] CHANGE 1 SENSOR TO CHECK GLUCOSE ONCE EVERY 14 DAYS      vitamin D2, Ergocalciferol, (DRISDOL) 1.25 MG (53306 UT) Cap capsule Take 50,000 Units by mouth every 7 days.      gabapentin (NEURONTIN) 300 MG Cap Take 300 mg by mouth 2 times a day.       "TOUJEO SOLOSTAR 300 UNIT/ML Solution Pen-injector Inject 20 Units under the skin every evening.      pioglitazone (ACTOS) 30 MG Tab Take 1 Tablet by mouth every day.      Empagliflozin-metFORMIN HCl ER (SYNJARDY XR) 12.5-1000 MG TABLET SR 24 HR Take 2 Tablets by mouth every morning.      [DISCONTINUED] ticagrelor (BRILINTA) 90 MG Tab tablet Take 1 Tablet by mouth 2 times a day. 180 Tablet 3     No facility-administered encounter medications on file as of 2023.     Review of Systems   Constitutional: Negative.    HENT: Negative.     Eyes: Negative.    Respiratory:  Positive for shortness of breath.    Cardiovascular:  Positive for leg swelling. Negative for chest pain, palpitations, orthopnea and PND.   Gastrointestinal: Negative.    Genitourinary: Negative.    Musculoskeletal: Negative.    Skin: Negative.    Neurological: Negative.    Endo/Heme/Allergies: Negative.    Psychiatric/Behavioral:  Negative for depression. The patient is not nervous/anxious.               Objective     /66 (BP Location: Left arm, Patient Position: Sitting, BP Cuff Size: Adult)   Pulse 83   Resp 16   Ht 1.88 m (6' 2\")   Wt 107 kg (236 lb)   SpO2 96%   BMI 30.30 kg/m²     Physical Exam  Constitutional:       Appearance: Normal appearance.   HENT:      Head: Normocephalic.   Neck:      Vascular: No JVD.   Cardiovascular:      Rate and Rhythm: Normal rate and regular rhythm.      Pulses: Normal pulses.      Heart sounds: Normal heart sounds. No murmur heard.     No friction rub.   Pulmonary:      Effort: Pulmonary effort is normal.      Breath sounds: Normal breath sounds.   Abdominal:      Palpations: Abdomen is soft.   Musculoskeletal:         General: Normal range of motion.      Right lower le+ Edema present.      Left lower le+ Edema present.   Skin:     General: Skin is warm and dry.   Neurological:      General: No focal deficit present.      Mental Status: He is alert and oriented to person, place, and time. "   Psychiatric:         Mood and Affect: Mood normal.         Behavior: Behavior normal.          Lab Results   Component Value Date/Time    CHOLSTRLTOT 159 06/22/2023 12:02 AM    LDL see below 06/22/2023 12:02 AM    HDL 25 (A) 06/22/2023 12:02 AM    TRIGLYCERIDE 551 (H) 06/22/2023 12:02 AM       Lab Results   Component Value Date/Time    SODIUM 137 06/24/2023 12:05 AM    POTASSIUM 4.0 06/24/2023 12:05 AM    CHLORIDE 102 06/24/2023 12:05 AM    CO2 22 06/24/2023 12:05 AM    GLUCOSE 262 (H) 06/24/2023 12:05 AM    BUN 15 06/24/2023 12:05 AM    CREATININE 0.84 06/24/2023 12:05 AM    BUNCREATRAT 19 12/31/2018 07:45 AM     Lab Results   Component Value Date/Time    ALKPHOSPHAT 84 06/22/2023 12:02 AM    ASTSGOT 23 06/22/2023 12:02 AM    ALTSGPT 35 06/22/2023 12:02 AM    TBILIRUBIN 0.5 06/22/2023 12:02 AM      Echocardiogram 6/22/2023  CONCLUSIONS  No prior study is available for comparison.   The left ventricular ejection fraction is estimated to be 65%.  No significant valvular abnormalities.      Angiogram 6/23/2023  CORONARY ANGIOGRAPHY:  The left main coronary artery: Large-caliber vessel with mild CAD, bifurcates to LAD and left circumflex  The left anterior descending coronary artery: Large caliber transapical vessel with severe 70-80% mid LAD stenosis status post PCI as detailed below.  There is also mid LAD myocardial bridge after the second diagonal branch.  Gives rise to 2 diagonal branches with luminal irregularities.  The left circumflex coronary artery: Large-caliber vessel with bifurcating OM and diffuse luminal irregularities  The right coronary artery: Moderate caliber dominant vessel with luminal irregularities     IMPRESSION:  1.  Severe mid LAD disease status post successful IVUS guided PCI deploying Synergy 4 x 24 mm CALE (postdilated to 4.7 mm proximally)  2.  High-normal resting LVEDP 16 mmHg    Echocardiogram 7/7/2023  CONCLUSIONS  Compared to the prior study on 06/22/2023, no significant  changes.  Normal left ventricular size, thickness, and systolic function.  The left ventricular ejection fraction is visually estimated to be 60%.  No significant valvular abnormalities.     Assessment & Plan     1. History of placement of stent in LAD coronary artery        2. Coronary artery disease involving native coronary artery of native heart with unstable angina pectoris (HCC)        3. Essential hypertension        4. Hypertriglyceridemia        5. Type 2 diabetes mellitus with diabetic polyneuropathy, without long-term current use of insulin (HCC)        6. Shortness of breath  prasugrel (EFFIENT) 10 MG Tab    Referral to Pulmonary and Sleep Medicine          Medical Decision Making: Today's Assessment/Status/Plan:        Shortness of breath   -possibly related to brilinta side effect, we will discontinue this in favor of  prasugrel 10 mg   -I have placed a referral to pulmonology    Coronary Artery Disease  S/p stent to LAD on 6/23/2023  -DAPT with ASA, prasugrel 10 mg for 12 months   -Rosuvastatin 40 mg daily  -BB Toprol 25 mg daily  -lisinopril 5 mg daily  -Meds-to-beds on discharge  -EF 65%  -Cardiac rehab referral   Education provided  -Discussed returning to ER if chest pain returns and/or call cardiology office at [phone] with any additional new or worsening symptoms  -Discussed CV risk factor modification including cholesterol management, blood pressure management, smoking cessation, diet and lifestyle changes.  -he is having class III symptoms, shortness of breath with mild physical activity, ordered stat echocardiogram     Hypertension  -Good control  -Continue regimen as outlined above  - goal < 130/80  - check BP daily 2 hours after taking medication and keep log of measurements      Hyperlipidemia  -Most recent LDL unable to calculate due to hypertriglyceridemia  -Continue simvastatin 40 mg daily  -Goal of less than 70  -Check lipid panel in 3 months     Follow up with Dr. Sharma  in August     This note was dictated using Dragon speech recognition software.

## 2023-08-31 ENCOUNTER — TELEPHONE (OUTPATIENT)
Dept: CARDIOLOGY | Facility: MEDICAL CENTER | Age: 58
End: 2023-08-31

## 2023-08-31 ENCOUNTER — OFFICE VISIT (OUTPATIENT)
Dept: CARDIOLOGY | Facility: MEDICAL CENTER | Age: 58
End: 2023-08-31
Attending: INTERNAL MEDICINE
Payer: COMMERCIAL

## 2023-08-31 VITALS
BODY MASS INDEX: 34.27 KG/M2 | HEIGHT: 74 IN | OXYGEN SATURATION: 95 % | DIASTOLIC BLOOD PRESSURE: 62 MMHG | HEART RATE: 86 BPM | RESPIRATION RATE: 16 BRPM | WEIGHT: 267 LBS | SYSTOLIC BLOOD PRESSURE: 98 MMHG

## 2023-08-31 DIAGNOSIS — I25.110 CORONARY ARTERY DISEASE INVOLVING NATIVE CORONARY ARTERY OF NATIVE HEART WITH UNSTABLE ANGINA PECTORIS (HCC): ICD-10-CM

## 2023-08-31 DIAGNOSIS — E78.5 DYSLIPIDEMIA: ICD-10-CM

## 2023-08-31 DIAGNOSIS — I10 ESSENTIAL HYPERTENSION: ICD-10-CM

## 2023-08-31 DIAGNOSIS — Z95.5 HISTORY OF PLACEMENT OF STENT IN LAD CORONARY ARTERY: ICD-10-CM

## 2023-08-31 PROCEDURE — 3078F DIAST BP <80 MM HG: CPT | Performed by: INTERNAL MEDICINE

## 2023-08-31 PROCEDURE — 3074F SYST BP LT 130 MM HG: CPT | Performed by: INTERNAL MEDICINE

## 2023-08-31 PROCEDURE — 99213 OFFICE O/P EST LOW 20 MIN: CPT | Performed by: INTERNAL MEDICINE

## 2023-08-31 PROCEDURE — 99214 OFFICE O/P EST MOD 30 MIN: CPT | Performed by: INTERNAL MEDICINE

## 2023-08-31 ASSESSMENT — ENCOUNTER SYMPTOMS
PALPITATIONS: 0
SHORTNESS OF BREATH: 0
MYALGIAS: 0
COUGH: 0
LOSS OF CONSCIOUSNESS: 0
DIZZINESS: 0

## 2023-08-31 ASSESSMENT — FIBROSIS 4 INDEX: FIB4 SCORE: 1.34

## 2023-08-31 NOTE — TELEPHONE ENCOUNTER
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     Form filled out and uploaded to chart. In media.    Juana triage RN, can you please print and  have SW sign form. There is no fax number on form to send to, pt will  in office. Let me know when done and where pt can  and I will call him. Also, can you please just verify with DERICK that there are no restrictions for pt as I've noted on form? Thank you!!    Pt will also need last office note, recent echo and stress test reports when he picks up. Usually I just fax them electronically, but pt picking up, so asking that you print please.

## 2023-08-31 NOTE — PROGRESS NOTES
Chief Complaint   Patient presents with    Hypertension    Coronary Artery Disease    Other     F/V Dx: History of placement of stent in LAD coronary artery       Subjective     Leighton Soria is a 57 y.o. male who presents today for follow-up cardiac care.    The patient has CAD, PCI mid LAD 4.0 x 24 mm CALE 6/23/2023, hypertension, dyslipidemia, diabetes mellitus type 2, obesity.    Originally seen by Yefri Randhawa MD at the time of his hospitalization at [facility] [place] for unstable angina pectoris on 6/22/2023 undergoing successful mid LAD PCI.  Echocardiogram showed normal LVEF 65%.  1C 11.4%.    Last seen in cardiology clinic 7/20/2023 by CHANTEL Isabel.  Had symptoms of shortness of breath and Brilinta was switched to Effient and this has resolved stop smoking after 42 pack years he was referred to pulmonary clinic.  Still gets winded when he has to walk up inclines.  Has not been able to get into cardiac rehab until November due to high demand.   anxious to get back to work and needs cardiac clearance.  Additionally he cannot return to work until his A1c is less than 10% has been seen at [facility] for the past 2 years and has a pending follow-up in 1 week.  Has 2 sisters 1 LPN, 1 nutritionist and he is now adjusted his diet to more of a plant-based cuisine.    Past Medical History:   Diagnosis Date    Chicken pox     Diabetes mellitus type 2, uncontrolled 12/16/2011    Hypertension 4/21/2010    Increased BMI 4/21/2010     Past Surgical History:   Procedure Laterality Date    AMPUTATION, TOE Left 12/30/2022    2nd toe    VENTRAL HERNIA REPAIR LAPAROSCOPIC  04/10/2015    Performed by Sadiq Ortega M.D. at SURGERY [facility] ORS    AMPUTATION, TOE  01/01/2006    left large toe    INGUINAL HERNIA REPAIR BILATERAL  01/01/1992    repaired twice    TONSILLECTOMY       Family History   Adopted: Yes     Social History     Socioeconomic History    Marital status:      Spouse name: Not on  file    Number of children: Not on file    Years of education: Not on file    Highest education level: Not on file   Occupational History    Not on file   Tobacco Use    Smoking status: Former     Current packs/day: 0.00     Average packs/day: 1 pack/day for 30.0 years (30.0 ttl pk-yrs)     Types: Cigarettes     Start date: 1984     Quit date: 2014     Years since quittin.0    Smokeless tobacco: Former     Quit date: 1982   Vaping Use    Vaping Use: Former    Quit date: 2023    Substances: Nicotine   Substance and Sexual Activity    Alcohol use: Yes     Comment: rare    Drug use: No    Sexual activity: Yes     Partners: Male, Female   Other Topics Concern    Not on file   Social History Narrative    Not on file     Social Determinants of Health     Financial Resource Strain: Not on file   Food Insecurity: Not on file   Transportation Needs: Not on file   Physical Activity: Not on file   Stress: Not on file   Social Connections: Not on file   Intimate Partner Violence: Not on file   Housing Stability: Not on file     Allergies   Allergen Reactions    Hctz      Leg cramps     Outpatient Encounter Medications as of 2023   Medication Sig Dispense Refill    HUMALOG KWIKPEN 100 UNIT/ML Solution Pen-injector injection PEN INJECT 6 TO 10 UNITS SUBCUTANEOUSLY ONCE DAILY WITH MEALS      OZEMPIC, 2 MG/DOSE, 8 MG/3ML Solution Pen-injector       prasugrel (EFFIENT) 10 MG Tab Take 1 Tablet by mouth every day. 90 Tablet 3    furosemide (LASIX) 20 MG Tab Take 1 Tablet by mouth 2 times a day as needed (for leg swelling, may repeat x 1). 30 Tablet 11    potassium chloride SA (KDUR) 20 MEQ Tab CR Take 1 Tablet by mouth 1 time a day as needed (to be taken with lasix). 30 Tablet 11    lisinopril (PRINIVIL) 5 MG Tab Take 1 Tablet by mouth every day. 100 Tablet 3    metoprolol SR (TOPROL XL) 25 MG TABLET SR 24 HR Take 1 Tablet by mouth every day. 100 Tablet 3    rosuvastatin (CRESTOR) 40 MG tablet Take 1 Tablet by  "mouth every evening. 100 Tablet 3    aspirin 81 MG EC tablet Take 1 Tablet by mouth every day. 100 Tablet 4    Continuous Blood Gluc Sensor (FREESTYLE MAKEDA 2 SENSOR) [facility] CHANGE 1 SENSOR TO CHECK GLUCOSE ONCE EVERY 14 DAYS      vitamin D2, Ergocalciferol, (DRISDOL) 1.25 MG (68157 UT) Cap capsule Take 50,000 Units by mouth every 7 days.      gabapentin (NEURONTIN) 300 MG Cap Take 300 mg by mouth 2 times a day.      TOUJEO SOLOSTAR 300 UNIT/ML Solution Pen-injector Inject 20 Units under the skin every evening.      pioglitazone (ACTOS) 30 MG Tab Take 1 Tablet by mouth every day.      Empagliflozin-metFORMIN HCl ER (SYNJARDY XR) 12.5-1000 MG TABLET SR 24 HR Take 2 Tablets by mouth every morning.       No facility-administered encounter medications on file as of 8/31/2023.     Review of Systems   Respiratory:  Negative for cough and shortness of breath.    Cardiovascular:  Negative for chest pain and palpitations.   Musculoskeletal:  Negative for myalgias.   Neurological:  Negative for dizziness and loss of consciousness.              Objective     BP (!) 0/0 (BP Location: Left arm, Patient Position: Sitting, BP Cuff Size: Adult)   Ht 1.88 m (6' 2\")   BMI 30.30 kg/m²     Physical Exam  Vitals reviewed.   Constitutional:       General: He is not in acute distress.     Appearance: He is well-developed.   Eyes:      Conjunctiva/sclera: Conjunctivae normal.      Pupils: Pupils are equal, round, and reactive to light.   Neck:      Vascular: No JVD.   Cardiovascular:      Rate and Rhythm: Normal rate and regular rhythm.      Pulses:           Radial pulses are 1+ on the right side and 1+ on the left side.      Heart sounds: Normal heart sounds. No murmur heard.     No friction rub. No gallop.   Pulmonary:      Effort: Pulmonary effort is normal. No accessory muscle usage or respiratory distress.      Breath sounds: Normal breath sounds. No wheezing or rales.   Chest:      Comments: Increased AP diameter  Abdominal:      " General: There is no distension.      Palpations: Abdomen is soft. There is no mass.      Tenderness: There is no abdominal tenderness.      Comments: Protuberant   Musculoskeletal:      Cervical back: Normal range of motion and neck supple.      Right lower leg: No edema.      Left lower leg: No edema.   Skin:     General: Skin is warm and dry.      Findings: No rash.      Nails: There is no clubbing.   Neurological:      Mental Status: He is alert and oriented to person, place, and time.   Psychiatric:         Behavior: Behavior normal.         CARDIAC CATHETERIZATION  CORONARY ANGIOGRAPHY:  The left main coronary artery: Large-caliber vessel with mild CAD, bifurcates to LAD and left circumflex  The left anterior descending coronary artery: Large caliber transapical vessel with severe 70-80% mid LAD stenosis status post PCI as detailed below.  There is also mid LAD myocardial bridge after the second diagonal branch.  Gives rise to 2 diagonal branches with luminal irregularities.  The left circumflex coronary artery: Large-caliber vessel with bifurcating OM and diffuse luminal irregularities  The right coronary artery: Moderate caliber dominant vessel with luminal irregularities  PCI: 4.0 x 24 mm CALE postdilated 4.5 mm mid LAD    ECHOCARDIOGRAM 6/22/2023  No prior study is available for comparison.   The left ventricular ejection fraction is estimated to be 65%.  No significant valvular abnormalities.      ECHOCARDIOGRAM 7/7/2023  Compared to the prior study on 06/22/2023, no significant changes.  Normal left ventricular size, thickness, and systolic function.  The left ventricular ejection fraction is visually estimated to be 60%.  No significant valvular abnormalities.   Assessment & Plan     1. History of placement of stent in LAD coronary artery        2. Coronary artery disease involving native coronary artery of native heart with unstable angina pectoris (HCC)        3. Essential hypertension        4.  Dyslipidemia            Medical Decision Making: Today's Assessment/Status/Plan:   Assessment  CAD  PCI mid LAD 4.0 x 24 mm CALE 6/23/2023  Hypertension  Dyslipidemia  Diabetes mellitus type 2  Obesity    Recommendation Discussion  CAD, clinically stable, continue DAPT x12 months, rosuvastatin, metoprolol.  Hypertension, currently controlled, continue metoprolol  Dyslipidemia, on rosuvastatin 40 mg daily, is yet to get follow-up lipid panel, reordered.  Cleared from a cardiac standpoint to return to work as a , paperwork pending.  RTC 6 months.

## 2023-09-01 NOTE — TELEPHONE ENCOUNTER
Echo, stress test and last OV note printed. 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

## 2023-09-06 NOTE — TELEPHONE ENCOUNTER
DOT form completed, scanned into [employer], and in envelope at TRAVIS Arias desk awaiting .     TRAVIS Romo: STEPHEN. Thank you.

## 2023-09-09 LAB
CHOLEST SERPL-MCNC: 101 MG/DL (ref 100–199)
HDLC SERPL-MCNC: 34 MG/DL
LABORATORY COMMENT REPORT: ABNORMAL
LDLC SERPL CALC-MCNC: 45 MG/DL (ref 0–99)
TRIGL SERPL-MCNC: 119 MG/DL (ref 0–149)
VLDLC SERPL CALC-MCNC: 22 MG/DL (ref 5–40)

## 2023-09-12 NOTE — RESULT ENCOUNTER NOTE
Reinier I have reviewed your most recent cholesterol panel and it is dramatically improved.  Stay on the same medication rosuvastatin.

## 2023-09-21 ENCOUNTER — TELEPHONE (OUTPATIENT)
Dept: CARDIOLOGY | Facility: MEDICAL CENTER | Age: 58
End: 2023-09-21
Payer: COMMERCIAL

## 2023-09-21 NOTE — TELEPHONE ENCOUNTER
Verito, can you help with this? I see the back to work forms were scanned into [employer], but do not see completed forms--just the blank ones. Do you have the completed forms? Looks like they are sending them again?    I completed DOT forms, but this is separate from what they are requesting now.

## 2023-09-21 NOTE — TELEPHONE ENCOUNTER
"    Caller: Sadiq from [employer]    Topic/issue: Sadiq from [employer] called about the patients paperwork to release him back to work. See encounter from 07.19.23. Sadiq explained there was not a specific :okay to return to work\" date on the paperwork that was filled out. They are hoping to get this information faxed over to them (fax number below) and they will be faxing over a new form for this. Please advise.     Fax Number: [phone]    Ref # [id number]     Thank you,     Rubi INIGUEZ.   "

## 2023-09-21 NOTE — TELEPHONE ENCOUNTER
Beck Romo, I believe this form was filled out with Caro Cohen at the patient's appointment the following day and the patient took it with him. He specifically requested it not to have the return to work date listed on it according to the [employer] messages. I unfortunately do not have a copy of the paperwork, as it was completed during the appointment because it had specific LH questions. The patient may still have the copy if needed, but it sounds like they are sending a new one anyway. Please let me know if I can help in any way!

## 2023-11-13 ENCOUNTER — OFFICE VISIT (OUTPATIENT)
Dept: SLEEP MEDICINE | Facility: MEDICAL CENTER | Age: 58
End: 2023-11-13
Payer: COMMERCIAL

## 2023-11-13 VITALS
HEIGHT: 74 IN | WEIGHT: 260 LBS | DIASTOLIC BLOOD PRESSURE: 64 MMHG | SYSTOLIC BLOOD PRESSURE: 116 MMHG | BODY MASS INDEX: 33.37 KG/M2 | HEART RATE: 87 BPM | OXYGEN SATURATION: 94 %

## 2023-11-13 DIAGNOSIS — I25.10 CORONARY ARTERY DISEASE DUE TO LIPID RICH PLAQUE: ICD-10-CM

## 2023-11-13 DIAGNOSIS — Z87.891 FORMER SMOKER: ICD-10-CM

## 2023-11-13 DIAGNOSIS — R06.09 DOE (DYSPNEA ON EXERTION): ICD-10-CM

## 2023-11-13 DIAGNOSIS — I25.83 CORONARY ARTERY DISEASE DUE TO LIPID RICH PLAQUE: ICD-10-CM

## 2023-11-13 PROCEDURE — 3078F DIAST BP <80 MM HG: CPT | Performed by: INTERNAL MEDICINE

## 2023-11-13 PROCEDURE — 99212 OFFICE O/P EST SF 10 MIN: CPT | Performed by: INTERNAL MEDICINE

## 2023-11-13 PROCEDURE — 3074F SYST BP LT 130 MM HG: CPT | Performed by: INTERNAL MEDICINE

## 2023-11-13 PROCEDURE — 99204 OFFICE O/P NEW MOD 45 MIN: CPT | Performed by: INTERNAL MEDICINE

## 2023-11-13 RX ORDER — ALBUTEROL SULFATE 90 UG/1
1-2 AEROSOL, METERED RESPIRATORY (INHALATION) EVERY 4 HOURS PRN
Qty: 1 EACH | Refills: 6 | Status: SHIPPED | OUTPATIENT
Start: 2023-11-13

## 2023-11-13 RX ORDER — ALBUTEROL SULFATE 90 UG/1
1-2 AEROSOL, METERED RESPIRATORY (INHALATION) EVERY 4 HOURS PRN
Qty: 1 EACH | Refills: 6 | Status: SHIPPED | OUTPATIENT
Start: 2023-11-13 | End: 2023-11-13 | Stop reason: SDUPTHER

## 2023-11-13 ASSESSMENT — ENCOUNTER SYMPTOMS
SHORTNESS OF BREATH: 1
NAUSEA: 0
DIAPHORESIS: 0
DIZZINESS: 0
DIARRHEA: 0
DEPRESSION: 0
STRIDOR: 0
WHEEZING: 1
FOCAL WEAKNESS: 0
COUGH: 0
CHILLS: 0
FEVER: 0
WHEEZING: 0
CHEST TIGHTNESS: 0
SPUTUM PRODUCTION: 0
DOUBLE VISION: 0
PND: 0
HEARTBURN: 0
FALLS: 0
NECK PAIN: 0
WEAKNESS: 0
SORE THROAT: 0
EYE PAIN: 0
CLAUDICATION: 0
CONSTIPATION: 0
PHOTOPHOBIA: 0
SINUS PAIN: 0
BACK PAIN: 0
HEADACHES: 0
PALPITATIONS: 0
VOMITING: 0
DYSPNEA AT REST: 1
TREMORS: 0
EYE REDNESS: 0
RESPIRATORY SYMPTOMS COMMENTS: YES
EYE DISCHARGE: 0
ORTHOPNEA: 0
ABDOMINAL PAIN: 0
HEMOPTYSIS: 0
MYALGIAS: 0
BLURRED VISION: 0
WEIGHT LOSS: 0
SPEECH CHANGE: 0

## 2023-11-13 ASSESSMENT — FIBROSIS 4 INDEX: FIB4 SCORE: 1.34

## 2023-11-13 NOTE — PROGRESS NOTES
Chief Complaint   Patient presents with    New Patient     REF CHANTEL ESCALANTE FOR SOB    Results     CXR 6/24/23       HPI: This patient is a 57 y.o. male presenting for evaluation of dyspnea on exertion.  The patient's past medical history significant for coronary artery disease status post PCI of LAD in June 2023, hypertension, type 2 diabetes.  He is a former tobacco smoker with between 35 and 40-pack-year history and quit in 2014.  He did use nicotine vape pen until April 1, 2023.  Family history is unknown as he was adopted.  He currently works as a  and has done so for the past 30 years.  No known occupational or environmental exposures.  He tells me he has had shortness of breath with activity for some time but this acutely worsened after PCI in June when he was placed on Brilinta.  He was short of breath walking several yards while on this medication which was later stopped and he was put on prasugrel instead.  Shortness of breath improved significantly but he still feels as out of proportion to his level of fitness.  Today he is wearing a boot on his right lower extremity due to Charcot foot.  He has also had several toes on the left lower extremity amputated related to diabetic neuropathy.  No chronic cough.  He is on no inhalers.  No wheezing.  Echocardiogram from 7/7/2023 showed normal left ventricular ejection function with no valvular abnormalities.  Normal RV size and function.  Chest x-ray from June 24 was clear.  He notes that at night he will occasionally check his SPO2 and can drop to as low as 88%.  He does take a daily nap but denies excessive daytime sleepiness, morning headaches or difficulty concentrating.  He does not know if he snores.    Past Medical History:   Diagnosis Date    Chicken pox     Diabetes mellitus type 2, uncontrolled 12/16/2011    Hypertension 4/21/2010    Increased BMI 4/21/2010       Social History     Socioeconomic History    Marital status:       Spouse name: Not on file    Number of children: Not on file    Years of education: Not on file    Highest education level: Not on file   Occupational History    Not on file   Tobacco Use    Smoking status: Former     Current packs/day: 0.00     Average packs/day: 1 pack/day for 30.0 years (30.0 ttl pk-yrs)     Types: Cigarettes     Start date: 1984     Quit date: 2014     Years since quittin.2    Smokeless tobacco: Former     Quit date: 1982   Vaping Use    Vaping Use: Former    Quit date: 2023    Substances: Nicotine   Substance and Sexual Activity    Alcohol use: Yes     Comment: rare    Drug use: No    Sexual activity: Yes     Partners: Male, Female   Other Topics Concern    Not on file   Social History Narrative    Not on file     Social Determinants of Health     Financial Resource Strain: Not on file   Food Insecurity: Not on file   Transportation Needs: Not on file   Physical Activity: Not on file   Stress: Not on file   Social Connections: Not on file   Intimate Partner Violence: Not on file   Housing Stability: Not on file       Family History   Adopted: Yes       Current Outpatient Medications on File Prior to Visit   Medication Sig Dispense Refill    TOUJEO MAX SOLOSTAR 300 UNIT/ML Solution Pen-injector       metoprolol SR (TOPROL XL) 25 MG TABLET SR 24 HR 25 mg.      aspirin 81 MG EC tablet 81 mg.      gabapentin (NEURONTIN) 300 MG Cap 300 mg.      lisinopril (PRINIVIL) 5 MG Tab 5 mg.      Empagliflozin-metFORMIN HCl (SYNJARDY) 12.5-1000 MG Tab 1 Tabs By Mouth 2 Times a Day. with meals.      rosuvastatin (CRESTOR) 40 MG tablet 40 mg.      BD PEN NEEDLE SAMIR 2ND GEN USE 1 TO INJECT INSULIN THREE TIMES DAILY      HUMALOG KWIKPEN 100 UNIT/ML Solution Pen-injector injection PEN INJECT 6 TO 10 UNITS SUBCUTANEOUSLY ONCE DAILY WITH MEALS      OZEMPIC, 2 MG/DOSE, 8 MG/3ML Solution Pen-injector       Continuous Blood Gluc Sensor (FREESTYLE MAKEDA 2 SENSOR) Misc CHANGE 1 SENSOR TO CHECK  "GLUCOSE ONCE EVERY 14 DAYS      vitamin D2, Ergocalciferol, (DRISDOL) 1.25 MG (46603 UT) Cap capsule Take 50,000 Units by mouth every 7 days.      pioglitazone (ACTOS) 30 MG Tab Take 1 Tablet by mouth every day.       No current facility-administered medications on file prior to visit.       Allergies: Brilinta [ticagrelor] and Hctz    ROS:   Review of Systems   Constitutional:  Negative for chills, diaphoresis, fever, malaise/fatigue and weight loss.   HENT:  Negative for congestion, ear discharge, ear pain, hearing loss, nosebleeds, sinus pain, sore throat and tinnitus.    Eyes:  Negative for blurred vision, double vision, photophobia, pain, discharge and redness.   Respiratory:  Positive for shortness of breath. Negative for cough, hemoptysis, sputum production, wheezing and stridor.    Cardiovascular:  Negative for chest pain, palpitations, orthopnea, claudication, leg swelling and PND.   Gastrointestinal:  Negative for abdominal pain, constipation, diarrhea, heartburn, nausea and vomiting.   Genitourinary:  Negative for dysuria and urgency.   Musculoskeletal:  Negative for back pain, falls, joint pain, myalgias and neck pain.   Skin:  Negative for itching and rash.   Neurological:  Negative for dizziness, tremors, speech change, focal weakness, weakness and headaches.   Endo/Heme/Allergies:  Negative for environmental allergies.   Psychiatric/Behavioral:  Negative for depression.        /64 (BP Location: Right arm, Patient Position: Sitting, BP Cuff Size: Adult)   Pulse 87   Ht 1.88 m (6' 2\")   Wt 118 kg (260 lb)   SpO2 94%     Physical Exam:  Physical Exam  Vitals reviewed.   Constitutional:       General: He is not in acute distress.     Appearance: Normal appearance. He is normal weight.   HENT:      Head: Normocephalic and atraumatic.      Right Ear: External ear normal.      Left Ear: External ear normal.      Nose: Nose normal. No congestion.      Mouth/Throat:      Mouth: Mucous membranes are " moist.      Pharynx: Oropharynx is clear. No oropharyngeal exudate.   Eyes:      General: No scleral icterus.     Extraocular Movements: Extraocular movements intact.      Conjunctiva/sclera: Conjunctivae normal.      Pupils: Pupils are equal, round, and reactive to light.   Cardiovascular:      Rate and Rhythm: Normal rate and regular rhythm.      Heart sounds: Normal heart sounds. No murmur heard.     No gallop.   Pulmonary:      Effort: Pulmonary effort is normal. No respiratory distress.      Breath sounds: Normal breath sounds. No wheezing or rales.   Abdominal:      General: There is no distension.      Palpations: Abdomen is soft.   Musculoskeletal:         General: Normal range of motion.      Cervical back: Normal range of motion and neck supple.      Left lower leg: No edema.      Comments: Right lower extremity in boot   Skin:     General: Skin is warm and dry.      Findings: No rash.   Neurological:      Mental Status: He is alert and oriented to person, place, and time.      Cranial Nerves: No cranial nerve deficit.   Psychiatric:         Mood and Affect: Mood normal.         Behavior: Behavior normal.         PFTs as reviewed by me personally: None  Imaging as reviewed by me personally: As per HPI    Assessment:  1. HERNANDEZ (dyspnea on exertion)  PULMONARY FUNCTION TESTS -Test requested: Complete Pulmonary Function Test    Overnight Oximetry    albuterol 108 (90 Base) MCG/ACT Aero Soln inhalation aerosol      2. Coronary artery disease due to lipid rich plaque        3. Former smoker  Overnight Oximetry    REFERRAL TO LUNG CANCER SCREENING PROGRAM          Plan:  Chronic and acutely worsened on Brilinta but has improved since being taken off although patient still feels shortness of breath is out of proportion to his level of fitness.  Given tobacco history, he is at risk for COPD.  I also suspect that there is an element of deconditioning.  He is waiting to get into cardiac rehab.  In the meantime, we  will start with a trial of inhaled short acting bronchodilators with albuterol and a spacer preactivity and as needed for acute shortness of breath.  We will obtain pulmonary function testing as well as overnight oximetry as patient notes low oxygen at night.  Status post PCI on antiplatelet therapy and followed by cardiology.  Tobacco free but patient is a candidate for lung cancer screening program and agreed to referral today.  Return in about 4 months (around 3/13/2024) for PFT and OPO as soon as can be scheduled .

## 2023-11-21 ENCOUNTER — TELEPHONE (OUTPATIENT)
Dept: SLEEP MEDICINE | Facility: MEDICAL CENTER | Age: 58
End: 2023-11-21
Payer: COMMERCIAL

## 2023-11-21 NOTE — TELEPHONE ENCOUNTER
1. Age 50-77 yrs of age? 57 yrs    2. Total Years Smoking cigarettes?32 yrs    3. 20 pack year hx of smoking, or greater (average packs per day)?   32 pk yrs @ 1 pk/day     4. Current smoker or if quit, has pt quit within last 15 yrs?  Quit 9 yrs (2014)    5. Completed Lung Cancer screen CT with [facility] previously?  NONE    6. Anything noted on previous CT involving lungs? (Nodules, Mass, Etc.)  NONE    7. Any signs or symptoms of lung cancer? ( New / change to Cough, Wheezing, S.O.B., coughing up blood, unexplained weight loss within last year)?   NONE - new or unexplained  - SOB attributed to Cardio meds- followed by Cardio, improved since meds changed, continuing to improve    8. Previous history of lung cancer?   NONE

## 2023-11-28 ENCOUNTER — HOME STUDY (OUTPATIENT)
Dept: SLEEP MEDICINE | Facility: MEDICAL CENTER | Age: 58
End: 2023-11-28
Attending: INTERNAL MEDICINE
Payer: COMMERCIAL

## 2023-11-28 ENCOUNTER — HOSPITAL ENCOUNTER (OUTPATIENT)
Dept: RADIOLOGY | Facility: MEDICAL CENTER | Age: 58
End: 2023-11-28
Attending: PHYSICAL MEDICINE & REHABILITATION
Payer: COMMERCIAL

## 2023-11-28 DIAGNOSIS — Z87.891 FORMER SMOKER: ICD-10-CM

## 2023-11-28 DIAGNOSIS — R06.09 DOE (DYSPNEA ON EXERTION): ICD-10-CM

## 2023-11-28 DIAGNOSIS — M54.50 LOW BACK PAIN, UNSPECIFIED BACK PAIN LATERALITY, UNSPECIFIED CHRONICITY, UNSPECIFIED WHETHER SCIATICA PRESENT: ICD-10-CM

## 2023-11-28 PROCEDURE — 94762 N-INVAS EAR/PLS OXIMTRY CONT: CPT | Performed by: INTERNAL MEDICINE

## 2023-11-28 PROCEDURE — 72100 X-RAY EXAM L-S SPINE 2/3 VWS: CPT

## 2023-11-29 ENCOUNTER — NON-PROVIDER VISIT (OUTPATIENT)
Dept: SLEEP MEDICINE | Facility: MEDICAL CENTER | Age: 58
End: 2023-11-29
Attending: INTERNAL MEDICINE
Payer: COMMERCIAL

## 2023-11-29 VITALS — BODY MASS INDEX: 35.25 KG/M2 | WEIGHT: 266 LBS | HEIGHT: 73 IN

## 2023-11-29 DIAGNOSIS — R06.09 DOE (DYSPNEA ON EXERTION): ICD-10-CM

## 2023-11-29 PROCEDURE — 94060 EVALUATION OF WHEEZING: CPT | Performed by: INTERNAL MEDICINE

## 2023-11-29 PROCEDURE — 94729 DIFFUSING CAPACITY: CPT | Performed by: INTERNAL MEDICINE

## 2023-11-29 PROCEDURE — 94060 EVALUATION OF WHEEZING: CPT | Mod: 26 | Performed by: INTERNAL MEDICINE

## 2023-11-29 PROCEDURE — 94729 DIFFUSING CAPACITY: CPT | Mod: 26 | Performed by: INTERNAL MEDICINE

## 2023-11-29 PROCEDURE — 94726 PLETHYSMOGRAPHY LUNG VOLUMES: CPT | Performed by: INTERNAL MEDICINE

## 2023-11-29 PROCEDURE — 94726 PLETHYSMOGRAPHY LUNG VOLUMES: CPT | Mod: 26 | Performed by: INTERNAL MEDICINE

## 2023-11-29 ASSESSMENT — PULMONARY FUNCTION TESTS
FVC_PERCENT_PREDICTED: 5
FEV1/FVC_PERCENT_PREDICTED: 99
FEV1_PREDICTED: 4.02
FEV1: 3.29
FEV1/FVC_PERCENT_PREDICTED: 77
FEV1_PERCENT_PREDICTED: 81
FEV1_LLN: 3.01
FEV1/FVC_PERCENT_PREDICTED: 96
FVC_LLN: 4.37
FEV1_PERCENT_CHANGE: 85
FVC_PREDICTED: 5.22
FEV1/FVC_PERCENT_LLN: 65
FVC_PERCENT_PREDICTED: 84
FEV1/FVC_PERCENT_CHANGE: 129
FEV1: 3.98
FEV1_PERCENT_PREDICTED: 4
FEV1/FVC_PERCENT_CHANGE: 2
FEV1/FVC_PERCENT_PREDICTED: 96
FVC: 4.4
FEV1/FVC: 75
FEV1/FVC: 88.84
FEV1_PERCENT_CHANGE: 110
FEV1/FVC: 75
FEV1/FVC_PREDICTED: 77
FEV1/FVC: 77
FVC: 4.48
FEV1/FVC_PERCENT_PREDICTED: 80

## 2023-11-29 ASSESSMENT — FIBROSIS 4 INDEX: FIB4 SCORE: 1.34

## 2023-11-29 NOTE — PROCEDURES
Tech: Fina Esteban, RT  Good patient effort & cooperation.  Test was performed on the [employer] Body Plethysmograph- Elite DX system.  The predicted sets used for Spirometry are GLI-2012, for Lung Volumes are ITS, and for DLCO is GLI 2017.  The results of this test meet the ATS standards for acceptability and repeatability.  The DLCO was uncorrected for Hb.  A bronchodilator of Ventolin HFA 2 puffs via spacer was administered.  DLCO was performed during dilation period.    Interpretation:  Baseline spirometry shows normal airflows.  No significant bronchodilator response.  Total lung capacity is normal however there is evidence for mild air trapping.  Diffusion capacity is normal.  Overall pulmonary function testing is within normal limits.  Lung volumes suggest mild air trapping however without airflow obstruction this may be physiologic normal.

## 2023-11-30 NOTE — PROGRESS NOTES
Subjective     Leighton Soria is a 57 y.o. male who presents with Lung Cancer Screening Program Prescreen            HPI  Patient seen today for initial lung cancer screening visit. Patient referred by his pulmonologist, Dr. Toth. His PCP is Ashley MARI.     The patient meets eligibility criteria including age, smoking history (20+ pack years), if former smoker, quit in the last 15 years, and absence of signs or symptoms of lung cancer.    - Age - 57  - Smoking history - Patient has smoked for 34 years at an average of 1 ppd = 34 pack year smoking history.  - Current smoking status - former smoker, quit smoking in 2014  - No symptoms of lung cancer and no previous history of lung cancer     Allergies   Allergen Reactions    Brilinta [Ticagrelor] Shortness of Breath    Hctz      Leg cramps     Current Outpatient Medications on File Prior to Visit   Medication Sig Dispense Refill    albuterol 108 (90 Base) MCG/ACT Aero Soln inhalation aerosol Inhale 1-2 Puffs every four hours as needed for Shortness of Breath. 1 Each 6    TOUJEO MAX SOLOSTAR 300 UNIT/ML Solution Pen-injector       metoprolol SR (TOPROL XL) 25 MG TABLET SR 24 HR 25 mg.      aspirin 81 MG EC tablet 81 mg.      gabapentin (NEURONTIN) 300 MG Cap 300 mg.      lisinopril (PRINIVIL) 5 MG Tab 5 mg.      Empagliflozin-metFORMIN HCl (SYNJARDY) 12.5-1000 MG Tab 1 Tabs By Mouth 2 Times a Day. with meals.      rosuvastatin (CRESTOR) 40 MG tablet 40 mg.      BD PEN NEEDLE SAMIR 2ND GEN USE 1 TO INJECT INSULIN THREE TIMES DAILY      HUMALOG KWIKPEN 100 UNIT/ML Solution Pen-injector injection PEN INJECT 6 TO 10 UNITS SUBCUTANEOUSLY ONCE DAILY WITH MEALS      OZEMPIC, 2 MG/DOSE, 8 MG/3ML Solution Pen-injector       Continuous Blood Gluc Sensor (FREESTYLE MAKEDA 2 SENSOR) [facility] [place] 1 SENSOR TO CHECK GLUCOSE ONCE EVERY 14 DAYS      vitamin D2, Ergocalciferol, (DRISDOL) 1.25 MG (82664 UT) Cap capsule Take 50,000 Units by mouth every 7 days.      pioglitazone  "(ACTOS) 30 MG Tab Take 1 Tablet by mouth every day.      Spacer/Aero-Holding Chambers Device 1 Device every day. 1 Each 6     No current facility-administered medications on file prior to visit.       Review of Systems   Constitutional:  Negative for chills, fever and weight loss.   Respiratory:  Positive for shortness of breath and wheezing. Negative for cough, hemoptysis and sputum production.    Cardiovascular:  Negative for chest pain and palpitations.   SOB was triggered by Brilanta and has improved with adjusting his medications.  He has had wheezing intermittent for years without recent worsening.       Objective     /68 (BP Location: Right arm, Patient Position: Sitting, BP Cuff Size: Adult)   Pulse 87   Resp 16   Ht 1.854 m (6' 1\")   Wt 124 kg (273 lb)   SpO2 94%   BMI 36.02 kg/m²      Physical Exam  Constitutional:       Appearance: Normal appearance.   Cardiovascular:      Rate and Rhythm: Normal rate and regular rhythm.   Pulmonary:      Effort: Pulmonary effort is normal.      Breath sounds: Normal breath sounds.   Musculoskeletal:         General: No swelling.   Neurological:      Mental Status: He is alert.               Assessment & Plan        1. Personal history of tobacco use, presenting hazards to health  CT-LUNG CANCER-SCREENING             We conducted a shared decision-making process using a decision aid. We reviewed benefits and harms of screening, including false positives and potential need for additional diagnostic testing, the possibility of over diagnosis, and total radiation exposure.    We discussed the importance of adhering to annual LDCT screening. We also discussed the impact of comorbities on the patient's the ability or willingness to undergo diagnostic procedure(s) and treatment.    Counseling on the importance of maintaining cigarette smoking abstinence if former smoker; or the importance of smoking cessation if current smoker and, if appropriate, furnishing of " information about tobacco cessation interventions.    Based on our discussion, we have decided to begin annual lung cancer screening starting now.    No follow-up needed unless imaging is abnormal

## 2023-12-03 NOTE — PROCEDURES
This is an overnight oximetry study performed on November 29, 2023 for duration of 8 hours and 55 minutes on room air.    Basal SpO2 was 88%.  Total time spent below saturation of 88% was 171 minutes.  There are no clustered desaturations.  Patient may benefit from supplemental oxygen at night.  Overall level of desaturation is mild.

## 2023-12-04 ENCOUNTER — OFFICE VISIT (OUTPATIENT)
Dept: SLEEP MEDICINE | Facility: MEDICAL CENTER | Age: 58
End: 2023-12-04
Attending: FAMILY MEDICINE
Payer: COMMERCIAL

## 2023-12-04 VITALS
BODY MASS INDEX: 36.18 KG/M2 | HEIGHT: 73 IN | HEART RATE: 87 BPM | WEIGHT: 273 LBS | RESPIRATION RATE: 16 BRPM | SYSTOLIC BLOOD PRESSURE: 108 MMHG | OXYGEN SATURATION: 94 % | DIASTOLIC BLOOD PRESSURE: 68 MMHG

## 2023-12-04 DIAGNOSIS — Z87.891 PERSONAL HISTORY OF TOBACCO USE, PRESENTING HAZARDS TO HEALTH: ICD-10-CM

## 2023-12-04 PROCEDURE — G0296 VISIT TO DETERM LDCT ELIG: HCPCS | Performed by: FAMILY MEDICINE

## 2023-12-04 PROCEDURE — 3078F DIAST BP <80 MM HG: CPT | Performed by: FAMILY MEDICINE

## 2023-12-04 PROCEDURE — 3074F SYST BP LT 130 MM HG: CPT | Performed by: FAMILY MEDICINE

## 2023-12-04 ASSESSMENT — ENCOUNTER SYMPTOMS
SHORTNESS OF BREATH: 1
PALPITATIONS: 0
HEMOPTYSIS: 0
WHEEZING: 1
SPUTUM PRODUCTION: 0
FEVER: 0
CHILLS: 0
WEIGHT LOSS: 0
COUGH: 0

## 2023-12-04 ASSESSMENT — FIBROSIS 4 INDEX: FIB4 SCORE: 1.34

## 2023-12-04 NOTE — Clinical Note
Thank you for referring Leighton to the Lung Cancer Screening program.  I enrolled him today. I will update you re: abnormal findings. -Dr. Leonora Payton- please send a copy of today's encounter note to Leighton's PCP, Ashley Rico at [place] [facility].

## 2023-12-14 ENCOUNTER — HOSPITAL ENCOUNTER (OUTPATIENT)
Dept: RADIOLOGY | Facility: MEDICAL CENTER | Age: 58
End: 2023-12-14
Attending: FAMILY MEDICINE
Payer: COMMERCIAL

## 2023-12-14 DIAGNOSIS — Z87.891 PERSONAL HISTORY OF TOBACCO USE, PRESENTING HAZARDS TO HEALTH: ICD-10-CM

## 2023-12-14 PROCEDURE — 71271 CT THORAX LUNG CANCER SCR C-: CPT

## 2023-12-18 ENCOUNTER — TELEPHONE (OUTPATIENT)
Dept: SLEEP MEDICINE | Facility: MEDICAL CENTER | Age: 58
End: 2023-12-18
Payer: COMMERCIAL

## 2023-12-18 NOTE — TELEPHONE ENCOUNTER
Phoned patient with results of LDCT exam performed 12/14/23.    Notified him that the results showed Small bilateral pulmonary nodules measuring up to 8 mm in the right apex.    We recommend a follow-up low-dose chest CT in 6 months to monitor for changes and PCP can order the follow-up or PCP can consider referring to [facility] for evaluation and continued monitoring.    Patient updated regarding incidental findings of atherosclerosis, coronary artery calcifications and mild-moderate emphysematous changes and will follow-up with JACEY Julio/referring provider.    Patient agrees to all recommendations.    Referring provider, Ashley MARI, notified of results and incidental findings per this communication.  Health maintenance to be updated and patient will be sent lung cancer screening result letter.        CT-LUNG CANCER-SCREENING    Result Date: 12/14/2023 12/14/2023 7:29 AM HISTORY/REASON FOR EXAM:  Lung Cancer Screening. 34 pack-year smoker. TECHNIQUE/EXAM DESCRIPTION AND NUMBER OF VIEWS: Lung cancer screening without contrast. Low dose noncontrast helical images were obtained of the chest from the lung apices through the costophrenic sulci utilizing thin collimation and intervals with reconstructed images sent to PACS in axial, coronal and sagittal planes. Low dose optimization technique was utilized for this CT exam including automated exposure control and adjustment of the mA and/or kV according to patient size. COMPARISON: None. FINDINGS: Lungs: 8 mm right apical nodule. 4 mm right lower lobe pulmonary nodule abutting the fissure. 5 mm right lower lobe pulmonary nodule abutting the pleura. 7 mm left lower lobe pulmonary nodule abutting the fissure. 3 mm left lower lobe pulmonary nodule abutting the fissure. Underlying upper lobe predominant centrilobular emphysema, mild to moderate in severity. Mediastinum/[place]: No significant adenopathy. Pleura: No pleural  effusion. Cardiac: Heart normal in size without pericardial effusion. There is coronary artery calcification. Vascular: Mild atherosclerosis of the aorta and its branch vessels. Soft tissues: Unremarkable. Bones: No acute or destructive process.     1.  Small bilateral pulmonary nodules measuring up to 8 mm in the right apex. 2.  Atherosclerosis including coronary artery disease. Lung RADS: 3 - Probably Benign: Probably benign finding(s) - short term followup suggested; includes nodules with a low likelihood of becoming a clinically active cancer Findings: solid nodule(s): greater than or equal to 6 to less than 8 mm at baseline OR new 4 mm to less than 6 mm part solid nodule(s): greater than or equal to 6 mm total diameter with solid component less than 6 mm OR new less than 6 mm total diameter non solid nodule(s) (GGN) greater than or equal to 30 mm on baseline CT or new Management: 6 month LDCT

## 2024-03-21 ENCOUNTER — OFFICE VISIT (OUTPATIENT)
Dept: SLEEP MEDICINE | Facility: MEDICAL CENTER | Age: 59
End: 2024-03-21
Attending: INTERNAL MEDICINE
Payer: COMMERCIAL

## 2024-03-21 VITALS
OXYGEN SATURATION: 95 % | DIASTOLIC BLOOD PRESSURE: 60 MMHG | SYSTOLIC BLOOD PRESSURE: 122 MMHG | WEIGHT: 278 LBS | HEIGHT: 73 IN | HEART RATE: 76 BPM | BODY MASS INDEX: 36.84 KG/M2

## 2024-03-21 DIAGNOSIS — R91.8 PULMONARY NODULES: ICD-10-CM

## 2024-03-21 DIAGNOSIS — I25.83 CORONARY ARTERY DISEASE DUE TO LIPID RICH PLAQUE: ICD-10-CM

## 2024-03-21 DIAGNOSIS — I25.10 CORONARY ARTERY DISEASE DUE TO LIPID RICH PLAQUE: ICD-10-CM

## 2024-03-21 DIAGNOSIS — R06.09 DOE (DYSPNEA ON EXERTION): ICD-10-CM

## 2024-03-21 DIAGNOSIS — G47.34 NOCTURNAL HYPOXEMIA: ICD-10-CM

## 2024-03-21 DIAGNOSIS — Z87.891 PERSONAL HISTORY OF TOBACCO USE, PRESENTING HAZARDS TO HEALTH: ICD-10-CM

## 2024-03-21 PROCEDURE — 3078F DIAST BP <80 MM HG: CPT | Performed by: INTERNAL MEDICINE

## 2024-03-21 PROCEDURE — 99214 OFFICE O/P EST MOD 30 MIN: CPT | Performed by: INTERNAL MEDICINE

## 2024-03-21 PROCEDURE — 99211 OFF/OP EST MAY X REQ PHY/QHP: CPT | Performed by: INTERNAL MEDICINE

## 2024-03-21 PROCEDURE — 3074F SYST BP LT 130 MM HG: CPT | Performed by: INTERNAL MEDICINE

## 2024-03-21 ASSESSMENT — ENCOUNTER SYMPTOMS
DEPRESSION: 0
STRIDOR: 0
SPEECH CHANGE: 0
EYE REDNESS: 0
FEVER: 0
WHEEZING: 0
DIZZINESS: 0
DOUBLE VISION: 0
CHILLS: 0
DIARRHEA: 0
BACK PAIN: 0
VOMITING: 0
DIAPHORESIS: 0
SINUS PAIN: 0
HEARTBURN: 0
HEADACHES: 0
SPUTUM PRODUCTION: 0
WEAKNESS: 0
ABDOMINAL PAIN: 0
ORTHOPNEA: 0
BLURRED VISION: 0
TREMORS: 0
PHOTOPHOBIA: 0
HEMOPTYSIS: 0
EYE PAIN: 0
NECK PAIN: 0
FALLS: 0
SHORTNESS OF BREATH: 0
MYALGIAS: 0
CONSTIPATION: 0
CLAUDICATION: 0
WEIGHT LOSS: 0
EYE DISCHARGE: 0
NAUSEA: 0
PND: 0
SORE THROAT: 0
COUGH: 0
PALPITATIONS: 0
FOCAL WEAKNESS: 0

## 2024-03-21 ASSESSMENT — FIBROSIS 4 INDEX: FIB4 SCORE: 1.37

## 2024-03-21 ASSESSMENT — PATIENT HEALTH QUESTIONNAIRE - PHQ9: CLINICAL INTERPRETATION OF PHQ2 SCORE: 0

## 2024-03-21 NOTE — PROGRESS NOTES
Chief Complaint   Patient presents with    Follow-Up     LAST SEEN 11/13/23    Results     PFT 11/29/23  OPO 11/28/23         HPI: This patient is a 58 y.o. male whom is followed in our clinic for the on exertion last seen by me on 7/13/2023 for initial consult.  PMH is significant for CAD status post PCI of LAD in 6/23, HTN, DM type II.  He is a former tobacco smoker with between 35 and 40-pack-year history and quit in 2014.  He did use nicotine vape pen for period of time but quit in 4/23.  He was referred to me for shortness of breath with activity for some time that acutely worsened after PCI in June when he was placed on Brilinta.  This was stopped and shortness of breath improved somewhat but not back to baseline.  He is currently on prasugrel.  Last clinic visit he was wearing a boot for Charcot foot that has since been removed so he has been able to ambulate more and is trying to increase activity.  Echocardiogram from July 23 showed normal LVEF with no valvular abnormalities.  Normal RV size and function.  Chest x-ray from June was clear.  He was referred to lung cancer screening program and underwent CT chest December which showed some subcentimeter nodular opacities but nothing concerning and no lymphadenopathy.  No significant emphysema.  Edwar function testing also from November showed normal airflows with normal lung volumes and normal DLCO.  Overall he continues to be short of breath with activity but this is slowly improving.  He is back at work.  He was not able to participate in rehab.  Overnight oximetry on room air did show mild hypoxemia after which we started him on supplemental oxygen at night.  He has been using this but no significant change in dyspnea during the day.    Past Medical History:   Diagnosis Date    Chicken pox     Diabetes mellitus type 2, uncontrolled 12/16/2011    Hypertension 4/21/2010    Increased BMI 4/21/2010       Social History     Socioeconomic History    Marital  status:      Spouse name: Not on file    Number of children: Not on file    Years of education: Not on file    Highest education level: Not on file   Occupational History    Not on file   Tobacco Use    Smoking status: Former     Current packs/day: 0.00     Average packs/day: 1 pack/day for 34.0 years (34.0 ttl pk-yrs)     Types: Cigarettes     Start date: 1980     Quit date: 2014     Years since quittin.6    Smokeless tobacco: Former     Quit date: 1982   Vaping Use    Vaping Use: Former    Quit date: 2023    Substances: Nicotine   Substance and Sexual Activity    Alcohol use: Yes     Comment: rare    Drug use: No    Sexual activity: Yes     Partners: Male, Female   Other Topics Concern    Not on file   Social History Narrative    Not on file     Social Determinants of Health     Financial Resource Strain: Not on file   Food Insecurity: Not on file   Transportation Needs: Not on file   Physical Activity: Not on file   Stress: Not on file   Social Connections: Not on file   Intimate Partner Violence: Not on file   Housing Stability: Not on file       Family History   Adopted: Yes       Current Outpatient Medications on File Prior to Visit   Medication Sig Dispense Refill    albuterol 108 (90 Base) MCG/ACT Aero Soln inhalation aerosol Inhale 1-2 Puffs every four hours as needed for Shortness of Breath. 1 Each 6    Spacer/Aero-Holding Chambers Device 1 Device every day. 1 Each 6    TOUJEO MAX SOLOSTAR 300 UNIT/ML Solution Pen-injector       metoprolol SR (TOPROL XL) 25 MG TABLET SR 24 HR 25 mg.      aspirin 81 MG EC tablet 81 mg.      gabapentin (NEURONTIN) 300 MG Cap 300 mg.      lisinopril (PRINIVIL) 5 MG Tab 5 mg.      Empagliflozin-metFORMIN HCl (SYNJARDY) 12.5-1000 MG Tab 1 Tabs By Mouth 2 Times a Day. with meals.      rosuvastatin (CRESTOR) 40 MG tablet 40 mg.      BD PEN NEEDLE SAMIR 2ND GEN USE 1 TO INJECT INSULIN THREE TIMES DAILY      HUMALOG KWIKPEN 100 UNIT/ML Solution  "Pen-injector injection PEN INJECT 6 TO 10 UNITS SUBCUTANEOUSLY ONCE DAILY WITH MEALS      OZEMPIC, 2 MG/DOSE, 8 MG/3ML Solution Pen-injector       Continuous Blood Gluc Sensor (FREESTYLE MAKEDA 2 SENSOR) [facility] CHANGE 1 SENSOR TO CHECK GLUCOSE ONCE EVERY 14 DAYS      vitamin D2, Ergocalciferol, (DRISDOL) 1.25 MG (57561 UT) Cap capsule Take 50,000 Units by mouth every 7 days.      pioglitazone (ACTOS) 30 MG Tab Take 1 Tablet by mouth every day.       No current facility-administered medications on file prior to visit.       Brilinta [ticagrelor] and Hctz      ROS:   Review of Systems   Constitutional:  Negative for chills, diaphoresis, fever, malaise/fatigue and weight loss.   HENT:  Negative for congestion, ear discharge, ear pain, hearing loss, nosebleeds, sinus pain, sore throat and tinnitus.    Eyes:  Negative for blurred vision, double vision, photophobia, pain, discharge and redness.   Respiratory:  Negative for cough, hemoptysis, sputum production, shortness of breath, wheezing and stridor.    Cardiovascular:  Negative for chest pain, palpitations, orthopnea, claudication, leg swelling and PND.   Gastrointestinal:  Negative for abdominal pain, constipation, diarrhea, heartburn, nausea and vomiting.   Genitourinary:  Negative for dysuria and urgency.   Musculoskeletal:  Negative for back pain, falls, joint pain, myalgias and neck pain.   Skin:  Negative for itching and rash.   Neurological:  Negative for dizziness, tremors, speech change, focal weakness, weakness and headaches.   Endo/Heme/Allergies:  Negative for environmental allergies.   Psychiatric/Behavioral:  Negative for depression.        /60 (BP Location: Right arm, Patient Position: Sitting, BP Cuff Size: Adult)   Pulse 76   Ht 1.854 m (6' 1\")   Wt (!) 126 kg (278 lb)   SpO2 95%   Physical Exam  Vitals reviewed.   Constitutional:       General: He is not in acute distress.     Appearance: Normal appearance. He is obese.   HENT:      Head: " Normocephalic and atraumatic.      Right Ear: External ear normal.      Left Ear: External ear normal.      Nose: Nose normal. No congestion.      Mouth/Throat:      Mouth: Mucous membranes are moist.      Pharynx: Oropharynx is clear. No oropharyngeal exudate.   Eyes:      General: No scleral icterus.     Extraocular Movements: Extraocular movements intact.      Conjunctiva/sclera: Conjunctivae normal.      Pupils: Pupils are equal, round, and reactive to light.   Cardiovascular:      Rate and Rhythm: Normal rate and regular rhythm.      Heart sounds: Normal heart sounds. No murmur heard.     No gallop.   Pulmonary:      Effort: Pulmonary effort is normal. No respiratory distress.      Breath sounds: Normal breath sounds. No wheezing or rales.   Abdominal:      Palpations: Abdomen is soft.   Musculoskeletal:         General: Normal range of motion.      Cervical back: Normal range of motion and neck supple.      Right lower leg: No edema.      Left lower leg: No edema.   Skin:     General: Skin is warm and dry.      Findings: No rash.   Neurological:      Mental Status: He is alert and oriented to person, place, and time.      Cranial Nerves: No cranial nerve deficit.   Psychiatric:         Mood and Affect: Mood normal.         Behavior: Behavior normal.         PFTs as reviewed by me personally: As per HPI    Imagaing as reviewed by me personally: As per HPI    Assessment:  1. HERNANDEZ (dyspnea on exertion)        2. Coronary artery disease due to lipid rich plaque        3. Personal history of tobacco use, presenting hazards to health        4. Nocturnal hypoxemia        5. Pulmonary nodules  CT-CHEST (THORAX) W/O          Plan:  Overall I think this is deconditioning.  Unfortunately he was not able to participate in cardiac rehab but I did encourage regular activity with efforts at improving his exercise tolerance.  Status post revascularization with normal LVEF.  Followed by cardiology.  Tobacco free and  participating in lung cancer screening program.  Compliant with and benefiting from supplemental oxygen.  No clear evidence of MIREYA.  Small without any concerning nodules.  CT ordered for 6 months.  Return in about 6 months (around 9/21/2024) for former smoker.

## 2024-06-24 ENCOUNTER — APPOINTMENT (OUTPATIENT)
Dept: RADIOLOGY | Facility: MEDICAL CENTER | Age: 59
End: 2024-06-24
Attending: INTERNAL MEDICINE
Payer: COMMERCIAL

## 2024-07-30 ENCOUNTER — TELEPHONE (OUTPATIENT)
Dept: SLEEP MEDICINE | Facility: MEDICAL CENTER | Age: 59
End: 2024-07-30
Payer: COMMERCIAL